# Patient Record
Sex: MALE | Race: WHITE | Employment: FULL TIME | URBAN - METROPOLITAN AREA
[De-identification: names, ages, dates, MRNs, and addresses within clinical notes are randomized per-mention and may not be internally consistent; named-entity substitution may affect disease eponyms.]

---

## 2017-01-26 ENCOUNTER — OFFICE VISIT (OUTPATIENT)
Dept: FAMILY MEDICINE CLINIC | Facility: CLINIC | Age: 52
End: 2017-01-26

## 2017-01-26 VITALS
HEIGHT: 70 IN | SYSTOLIC BLOOD PRESSURE: 120 MMHG | DIASTOLIC BLOOD PRESSURE: 80 MMHG | RESPIRATION RATE: 16 BRPM | BODY MASS INDEX: 33.96 KG/M2 | TEMPERATURE: 98 F | OXYGEN SATURATION: 99 % | WEIGHT: 237.25 LBS | HEART RATE: 78 BPM

## 2017-01-26 DIAGNOSIS — M67.432 GANGLION CYST OF WRIST, LEFT: Primary | ICD-10-CM

## 2017-01-26 DIAGNOSIS — I49.9 CARDIAC ARRHYTHMIA, UNSPECIFIED CARDIAC ARRHYTHMIA TYPE: ICD-10-CM

## 2017-01-26 PROCEDURE — 99212 OFFICE O/P EST SF 10 MIN: CPT | Performed by: FAMILY MEDICINE

## 2017-01-26 RX ORDER — VERAPAMIL HYDROCHLORIDE 240 MG/1
TABLET, FILM COATED, EXTENDED RELEASE ORAL
Qty: 30 TABLET | Refills: 5 | Status: SHIPPED | OUTPATIENT
Start: 2017-01-26 | End: 2017-08-07

## 2017-01-26 NOTE — PROGRESS NOTES
Cristi Vicente is a 46year old male. CHIEF COMPLAINT   Left wrist lump  HPI:   Left wrist lump x 1 month. No pain. No injury.       Current Outpatient Prescriptions:  VERAPAMIL HCL  MG Oral Capsule SR 24 Hr TAKE ONE CAPSULE BY MOUTH EVERY NIGHT Refills    Verapamil HCl  MG Oral Tab CR 30 tablet 5      Sig: TAKE ONE TABLET BY MOUTH EVERY NIGHT AT BEDTIME           Imaging & Consults:  ORTHOPEDIC - INTERNAL    The patient indicates understanding of these issues and agrees to the plan.   The pa

## 2017-05-12 ENCOUNTER — TELEPHONE (OUTPATIENT)
Dept: FAMILY MEDICINE CLINIC | Facility: CLINIC | Age: 52
End: 2017-05-12

## 2017-05-12 ENCOUNTER — CHARTING TRANS (OUTPATIENT)
Dept: OTHER | Age: 52
End: 2017-05-12

## 2017-05-12 NOTE — TELEPHONE ENCOUNTER
Pt transferred to nurse. He states \"I think I may have an eye infection and would like an appt\". Reports having some puffiness under L eye, itchy in the corner. Tender to the touch. No discharge, no vision issues. Started last night.  I advised WIC as we

## 2017-08-07 ENCOUNTER — OFFICE VISIT (OUTPATIENT)
Dept: FAMILY MEDICINE CLINIC | Facility: CLINIC | Age: 52
End: 2017-08-07

## 2017-08-07 VITALS
BODY MASS INDEX: 33.18 KG/M2 | SYSTOLIC BLOOD PRESSURE: 118 MMHG | OXYGEN SATURATION: 98 % | HEART RATE: 76 BPM | WEIGHT: 237 LBS | DIASTOLIC BLOOD PRESSURE: 74 MMHG | TEMPERATURE: 98 F | HEIGHT: 71 IN | RESPIRATION RATE: 20 BRPM

## 2017-08-07 DIAGNOSIS — J40 BRONCHITIS: ICD-10-CM

## 2017-08-07 DIAGNOSIS — J01.00 ACUTE MAXILLARY SINUSITIS, RECURRENCE NOT SPECIFIED: Primary | ICD-10-CM

## 2017-08-07 PROCEDURE — 99213 OFFICE O/P EST LOW 20 MIN: CPT | Performed by: NURSE PRACTITIONER

## 2017-08-07 RX ORDER — ALBUTEROL SULFATE 90 UG/1
AEROSOL, METERED RESPIRATORY (INHALATION)
Qty: 1 INHALER | Refills: 0 | Status: SHIPPED | OUTPATIENT
Start: 2017-08-07 | End: 2018-02-27

## 2017-08-07 RX ORDER — AMOXICILLIN 875 MG/1
875 TABLET, COATED ORAL 2 TIMES DAILY
Qty: 20 TABLET | Refills: 0 | Status: SHIPPED | OUTPATIENT
Start: 2017-08-07 | End: 2017-08-17

## 2017-08-07 RX ORDER — BENZONATATE 200 MG/1
200 CAPSULE ORAL 3 TIMES DAILY PRN
Qty: 20 CAPSULE | Refills: 0 | Status: SHIPPED | OUTPATIENT
Start: 2017-08-07 | End: 2018-02-27 | Stop reason: ALTCHOICE

## 2017-08-07 RX ORDER — VERAPAMIL HYDROCHLORIDE 240 MG/1
CAPSULE, EXTENDED RELEASE ORAL
COMMUNITY
Start: 2017-07-13 | End: 2017-08-09

## 2017-08-07 NOTE — PROGRESS NOTES
CHIEF COMPLAINT:   Patient presents with:  Sinus Problem: s/s for 7-8 days  Cough: wet  Sore Throat      HPI:   Valarie Florez is a 45year old male who presents for cold symptoms for  10  days.  Symptoms have progressed into sinus congestion and been wo N/A      Comment: Procedure: COLONOSCOPY;  Surgeon: Doug Lopez DO;  Location:  ENDOSCOPY  No date: UPPER GI ENDOSCOPY,EXAM   No family history on file.    Smoking status: Never Smoker instructions as listed in Patient Instructions    Meds & Refills for this Visit:    Signed Prescriptions Disp Refills    benzonatate 200 MG Oral Cap 20 capsule 0      Sig: Take 1 capsule (200 mg total) by mouth 3 (three) times daily as needed for cough.

## 2017-08-17 RX ORDER — VERAPAMIL HYDROCHLORIDE 240 MG/1
CAPSULE, EXTENDED RELEASE ORAL
Qty: 30 CAPSULE | Refills: 1 | Status: SHIPPED | OUTPATIENT
Start: 2017-08-17 | End: 2017-10-12

## 2017-10-09 DIAGNOSIS — K21.9 GASTROESOPHAGEAL REFLUX DISEASE WITHOUT ESOPHAGITIS: ICD-10-CM

## 2017-10-09 NOTE — TELEPHONE ENCOUNTER
Pt needs to be seen to follow up on his GERD with either Dr. Rogelio Martinez or S. Dressler. Please call him. Then refill can be forwarded to provider for authorization.

## 2017-10-10 RX ORDER — LANSOPRAZOLE 30 MG/1
CAPSULE, DELAYED RELEASE ORAL
Qty: 90 CAPSULE | Refills: 0 | Status: SHIPPED | OUTPATIENT
Start: 2017-10-10 | End: 2018-02-25

## 2017-10-12 RX ORDER — VERAPAMIL HYDROCHLORIDE 240 MG/1
CAPSULE, EXTENDED RELEASE ORAL
Qty: 30 CAPSULE | Refills: 0 | Status: SHIPPED | OUTPATIENT
Start: 2017-10-12 | End: 2017-11-18

## 2017-11-20 RX ORDER — VERAPAMIL HYDROCHLORIDE 240 MG/1
CAPSULE, EXTENDED RELEASE ORAL
Qty: 30 CAPSULE | Refills: 0 | Status: SHIPPED | OUTPATIENT
Start: 2017-11-20 | End: 2017-11-21

## 2017-11-22 RX ORDER — VERAPAMIL HYDROCHLORIDE 240 MG/1
CAPSULE, EXTENDED RELEASE ORAL
Qty: 30 CAPSULE | Refills: 1 | Status: SHIPPED | OUTPATIENT
Start: 2017-11-22 | End: 2018-02-15

## 2018-02-15 DIAGNOSIS — Z13.220 SCREENING FOR LIPOID DISORDERS: Primary | ICD-10-CM

## 2018-02-15 DIAGNOSIS — Z13.0 SCREENING FOR IRON DEFICIENCY ANEMIA: ICD-10-CM

## 2018-02-15 DIAGNOSIS — Z12.5 SCREENING FOR PROSTATE CANCER: ICD-10-CM

## 2018-02-15 DIAGNOSIS — Z13.228 SCREENING FOR ENDOCRINE, METABOLIC AND IMMUNITY DISORDER: ICD-10-CM

## 2018-02-15 DIAGNOSIS — Z13.0 SCREENING FOR ENDOCRINE, METABOLIC AND IMMUNITY DISORDER: ICD-10-CM

## 2018-02-15 DIAGNOSIS — Z13.29 SCREENING FOR ENDOCRINE, METABOLIC AND IMMUNITY DISORDER: ICD-10-CM

## 2018-02-15 NOTE — TELEPHONE ENCOUNTER
Please call pt. He has not been seen for his cardiac dysrhythmia for over 1 year. He can see either S. Dressler or Dr. Jones Ours.

## 2018-02-16 RX ORDER — VERAPAMIL HYDROCHLORIDE 240 MG/1
CAPSULE, EXTENDED RELEASE ORAL
Qty: 30 CAPSULE | Refills: 0 | Status: SHIPPED | OUTPATIENT
Start: 2018-02-16 | End: 2018-03-25

## 2018-02-16 NOTE — TELEPHONE ENCOUNTER
Patient requested a Physical appointment made for 2/28/18. Patient is also asking for labs to be put in so he doesn't have to come twice.

## 2018-02-25 DIAGNOSIS — K21.9 GASTROESOPHAGEAL REFLUX DISEASE WITHOUT ESOPHAGITIS: ICD-10-CM

## 2018-02-27 ENCOUNTER — OFFICE VISIT (OUTPATIENT)
Dept: FAMILY MEDICINE CLINIC | Facility: CLINIC | Age: 53
End: 2018-02-27

## 2018-02-27 ENCOUNTER — LAB ENCOUNTER (OUTPATIENT)
Dept: LAB | Age: 53
End: 2018-02-27
Attending: FAMILY MEDICINE
Payer: COMMERCIAL

## 2018-02-27 VITALS
RESPIRATION RATE: 16 BRPM | TEMPERATURE: 98 F | HEIGHT: 70.5 IN | HEART RATE: 76 BPM | SYSTOLIC BLOOD PRESSURE: 114 MMHG | WEIGHT: 237 LBS | DIASTOLIC BLOOD PRESSURE: 68 MMHG | BODY MASS INDEX: 33.55 KG/M2

## 2018-02-27 DIAGNOSIS — Z13.228 SCREENING FOR ENDOCRINE, METABOLIC, AND IMMUNITY DISORDER: ICD-10-CM

## 2018-02-27 DIAGNOSIS — Z13.0 SCREENING FOR ENDOCRINE, METABOLIC AND IMMUNITY DISORDER: ICD-10-CM

## 2018-02-27 DIAGNOSIS — Z99.89 OSA ON CPAP: ICD-10-CM

## 2018-02-27 DIAGNOSIS — Z13.29 SCREENING FOR ENDOCRINE, METABOLIC AND IMMUNITY DISORDER: ICD-10-CM

## 2018-02-27 DIAGNOSIS — Z12.5 SCREENING FOR PROSTATE CANCER: ICD-10-CM

## 2018-02-27 DIAGNOSIS — Z13.220 SCREENING FOR LIPOID DISORDERS: ICD-10-CM

## 2018-02-27 DIAGNOSIS — Z13.29 SCREENING FOR ENDOCRINE, METABOLIC, AND IMMUNITY DISORDER: ICD-10-CM

## 2018-02-27 DIAGNOSIS — I49.9 CARDIAC ARRHYTHMIA, UNSPECIFIED CARDIAC ARRHYTHMIA TYPE: ICD-10-CM

## 2018-02-27 DIAGNOSIS — Z13.0 SCREENING FOR ENDOCRINE, METABOLIC, AND IMMUNITY DISORDER: ICD-10-CM

## 2018-02-27 DIAGNOSIS — Z00.00 ANNUAL PHYSICAL EXAM: Primary | ICD-10-CM

## 2018-02-27 DIAGNOSIS — G47.33 OSA ON CPAP: ICD-10-CM

## 2018-02-27 DIAGNOSIS — Z13.0 SCREENING FOR IRON DEFICIENCY ANEMIA: ICD-10-CM

## 2018-02-27 DIAGNOSIS — K21.9 GASTROESOPHAGEAL REFLUX DISEASE WITHOUT ESOPHAGITIS: ICD-10-CM

## 2018-02-27 DIAGNOSIS — Z13.228 SCREENING FOR ENDOCRINE, METABOLIC AND IMMUNITY DISORDER: ICD-10-CM

## 2018-02-27 LAB
ALBUMIN SERPL-MCNC: 3.9 G/DL (ref 3.5–4.8)
ALP LIVER SERPL-CCNC: 60 U/L (ref 45–117)
ALT SERPL-CCNC: 53 U/L (ref 17–63)
AST SERPL-CCNC: 22 U/L (ref 15–41)
BASOPHILS # BLD AUTO: 0.01 X10(3) UL (ref 0–0.1)
BASOPHILS NFR BLD AUTO: 0.1 %
BILIRUB SERPL-MCNC: 0.5 MG/DL (ref 0.1–2)
BUN BLD-MCNC: 12 MG/DL (ref 8–20)
CALCIUM BLD-MCNC: 9.6 MG/DL (ref 8.3–10.3)
CHLORIDE: 103 MMOL/L (ref 101–111)
CHOLEST SMN-MCNC: 199 MG/DL (ref ?–200)
CO2: 28 MMOL/L (ref 22–32)
CREAT BLD-MCNC: 0.84 MG/DL (ref 0.7–1.3)
EOSINOPHIL # BLD AUTO: 0.17 X10(3) UL (ref 0–0.3)
EOSINOPHIL NFR BLD AUTO: 2.3 %
ERYTHROCYTE [DISTWIDTH] IN BLOOD BY AUTOMATED COUNT: 13 % (ref 11.5–16)
GLUCOSE BLD-MCNC: 78 MG/DL (ref 70–99)
HCT VFR BLD AUTO: 46.8 % (ref 37–53)
HDLC SERPL-MCNC: 62 MG/DL (ref 45–?)
HDLC SERPL: 3.21 {RATIO} (ref ?–4.97)
HGB BLD-MCNC: 15.7 G/DL (ref 13–17)
IMMATURE GRANULOCYTE COUNT: 0.03 X10(3) UL (ref 0–1)
IMMATURE GRANULOCYTE RATIO %: 0.4 %
LDLC SERPL CALC-MCNC: 112 MG/DL (ref ?–130)
LYMPHOCYTES # BLD AUTO: 2.3 X10(3) UL (ref 0.9–4)
LYMPHOCYTES NFR BLD AUTO: 31.1 %
M PROTEIN MFR SERPL ELPH: 7.8 G/DL (ref 6.1–8.3)
MCH RBC QN AUTO: 31.2 PG (ref 27–33.2)
MCHC RBC AUTO-ENTMCNC: 33.5 G/DL (ref 31–37)
MCV RBC AUTO: 93 FL (ref 80–99)
MONOCYTES # BLD AUTO: 0.52 X10(3) UL (ref 0.1–1)
MONOCYTES NFR BLD AUTO: 7 %
NEUTROPHIL ABS PRELIM: 4.37 X10 (3) UL (ref 1.3–6.7)
NEUTROPHILS # BLD AUTO: 4.37 X10(3) UL (ref 1.3–6.7)
NEUTROPHILS NFR BLD AUTO: 59.1 %
NONHDLC SERPL-MCNC: 137 MG/DL (ref ?–130)
PLATELET # BLD AUTO: 321 10(3)UL (ref 150–450)
POTASSIUM SERPL-SCNC: 3.9 MMOL/L (ref 3.6–5.1)
PSA SERPL-MCNC: 2.16 NG/ML (ref 0.01–4)
RBC # BLD AUTO: 5.03 X10(6)UL (ref 4.3–5.7)
RED CELL DISTRIBUTION WIDTH-SD: 44.6 FL (ref 35.1–46.3)
SODIUM SERPL-SCNC: 140 MMOL/L (ref 136–144)
TRIGL SERPL-MCNC: 127 MG/DL (ref ?–150)
VLDLC SERPL CALC-MCNC: 25 MG/DL (ref 5–40)
WBC # BLD AUTO: 7.4 X10(3) UL (ref 4–13)

## 2018-02-27 PROCEDURE — 84153 ASSAY OF PSA TOTAL: CPT | Performed by: FAMILY MEDICINE

## 2018-02-27 PROCEDURE — 80061 LIPID PANEL: CPT | Performed by: FAMILY MEDICINE

## 2018-02-27 PROCEDURE — 80053 COMPREHEN METABOLIC PANEL: CPT | Performed by: FAMILY MEDICINE

## 2018-02-27 PROCEDURE — 99396 PREV VISIT EST AGE 40-64: CPT | Performed by: FAMILY MEDICINE

## 2018-02-27 PROCEDURE — 36415 COLL VENOUS BLD VENIPUNCTURE: CPT | Performed by: FAMILY MEDICINE

## 2018-02-27 PROCEDURE — 85025 COMPLETE CBC W/AUTO DIFF WBC: CPT | Performed by: FAMILY MEDICINE

## 2018-02-27 NOTE — PROGRESS NOTES
Shan Harry is a 45year old male who presents for a complete physical exam.   HPI:   Pt complains of nothing specific. He states his arrhythmia has been quiescent.       Wt Readings from Last 6 Encounters:  02/27/18 : 237 lb  08/07/17 : 237 lb  01/26 and gastroduodenitis without mention of hemorrhage    • Unspecified visual disturbance    • Visual impairment     glasses,contacts       PAST SURGICAL HISTORY:     Past Surgical History:  2001: CHOLECYSTECTOMY  No date: COLONOSCOPY  7/2/2015: COLONOSCOPY N BS's,no masses, HSM or tenderness  : two descended testes,no masses,no hernia,no penile lesions  RECTAL: good rectal tone, prostate shows no masses, stool is OB negative  MUSCULOSKELETAL: back is not tender, no joint swelling noted  EXTREMITIES: no cyano

## 2018-02-28 ENCOUNTER — TELEPHONE (OUTPATIENT)
Dept: FAMILY MEDICINE CLINIC | Facility: CLINIC | Age: 53
End: 2018-02-28

## 2018-02-28 DIAGNOSIS — R10.9 RIGHT SIDED ABDOMINAL PAIN: Primary | ICD-10-CM

## 2018-02-28 NOTE — TELEPHONE ENCOUNTER
----- Message from Isela Whitt, [address] sent at 2/28/2018 12:52 PM CST -----  Called pt with findings and recommendations. Pt voiced understanding and agreed to plan.   Pt stated that at his last visit Dr Richy Abrams had said that he would order an US for pt'

## 2018-03-01 RX ORDER — LANSOPRAZOLE 30 MG/1
CAPSULE, DELAYED RELEASE ORAL
Qty: 90 CAPSULE | Refills: 1 | Status: SHIPPED | OUTPATIENT
Start: 2018-03-01 | End: 2021-09-23

## 2018-03-26 RX ORDER — VERAPAMIL HYDROCHLORIDE 240 MG/1
CAPSULE, EXTENDED RELEASE ORAL
Qty: 90 CAPSULE | Refills: 0 | Status: SHIPPED | OUTPATIENT
Start: 2018-03-26 | End: 2018-06-30

## 2018-06-14 ENCOUNTER — CHARTING TRANS (OUTPATIENT)
Dept: OTHER | Age: 53
End: 2018-06-14

## 2018-07-02 RX ORDER — VERAPAMIL HYDROCHLORIDE 240 MG/1
CAPSULE, EXTENDED RELEASE ORAL
Qty: 30 CAPSULE | Refills: 1 | Status: SHIPPED | OUTPATIENT
Start: 2018-07-02 | End: 2018-08-29

## 2018-09-04 RX ORDER — VERAPAMIL HYDROCHLORIDE 240 MG/1
CAPSULE, EXTENDED RELEASE ORAL
Qty: 30 CAPSULE | Refills: 0 | Status: SHIPPED | OUTPATIENT
Start: 2018-09-04 | End: 2018-09-28

## 2018-10-01 RX ORDER — VERAPAMIL HYDROCHLORIDE 240 MG/1
CAPSULE, EXTENDED RELEASE ORAL
Qty: 30 CAPSULE | Refills: 0 | Status: SHIPPED | OUTPATIENT
Start: 2018-10-01 | End: 2018-11-05

## 2018-11-01 VITALS — TEMPERATURE: 98.3 F

## 2018-11-06 RX ORDER — VERAPAMIL HYDROCHLORIDE 240 MG/1
CAPSULE, EXTENDED RELEASE ORAL
Qty: 90 CAPSULE | Refills: 0 | Status: SHIPPED | OUTPATIENT
Start: 2018-11-06 | End: 2019-02-26

## 2018-11-06 RX ORDER — VERAPAMIL HYDROCHLORIDE 240 MG/1
CAPSULE, EXTENDED RELEASE ORAL
Qty: 30 CAPSULE | Refills: 0 | Status: SHIPPED | OUTPATIENT
Start: 2018-11-06 | End: 2018-11-06

## 2018-12-04 ENCOUNTER — OFFICE VISIT (OUTPATIENT)
Dept: FAMILY MEDICINE CLINIC | Facility: CLINIC | Age: 53
End: 2018-12-04
Payer: COMMERCIAL

## 2018-12-04 VITALS
BODY MASS INDEX: 34.26 KG/M2 | HEART RATE: 76 BPM | WEIGHT: 242 LBS | SYSTOLIC BLOOD PRESSURE: 122 MMHG | DIASTOLIC BLOOD PRESSURE: 85 MMHG | HEIGHT: 70.5 IN | RESPIRATION RATE: 16 BRPM

## 2018-12-04 DIAGNOSIS — I49.9 CARDIAC ARRHYTHMIA, UNSPECIFIED CARDIAC ARRHYTHMIA TYPE: Primary | ICD-10-CM

## 2018-12-04 DIAGNOSIS — Z12.5 SCREENING FOR PROSTATE CANCER: ICD-10-CM

## 2018-12-04 DIAGNOSIS — K21.9 GASTROESOPHAGEAL REFLUX DISEASE WITHOUT ESOPHAGITIS: ICD-10-CM

## 2018-12-04 DIAGNOSIS — Z13.220 SCREENING FOR LIPOID DISORDERS: ICD-10-CM

## 2018-12-04 DIAGNOSIS — Z80.8 FAMILY HISTORY OF MELANOMA: ICD-10-CM

## 2018-12-04 PROCEDURE — 99214 OFFICE O/P EST MOD 30 MIN: CPT | Performed by: FAMILY MEDICINE

## 2018-12-04 NOTE — PROGRESS NOTES
Pastor Bashir is a 52year old male. HPI:   Pt is here for follow up of his cardiac dysrhythmia. Has had no chest pain or SOB. No palpitations.   He does have a history of GERD and takes lansoprazole 30 mg 1 capsule daily as needed  Has family hx of me well developed, well nourished,in no apparent distress  SKIN: no rashes,no suspicious lesions. Wart on right index finger.   HEENT: atraumatic, normocephalic,ears and throat are clear  NECK: supple,no adenopathy,no bruits  LUNGS: clear to auscultation  CARD

## 2019-02-26 RX ORDER — VERAPAMIL HYDROCHLORIDE 240 MG/1
CAPSULE, EXTENDED RELEASE ORAL
Qty: 30 CAPSULE | Refills: 3 | Status: SHIPPED | OUTPATIENT
Start: 2019-02-26 | End: 2019-07-10

## 2019-05-02 ENCOUNTER — LAB ENCOUNTER (OUTPATIENT)
Dept: LAB | Age: 54
End: 2019-05-02
Attending: FAMILY MEDICINE
Payer: COMMERCIAL

## 2019-05-02 DIAGNOSIS — K21.9 GASTROESOPHAGEAL REFLUX DISEASE WITHOUT ESOPHAGITIS: ICD-10-CM

## 2019-05-02 DIAGNOSIS — I49.9 CARDIAC ARRHYTHMIA, UNSPECIFIED CARDIAC ARRHYTHMIA TYPE: ICD-10-CM

## 2019-05-02 DIAGNOSIS — Z13.220 SCREENING FOR LIPOID DISORDERS: ICD-10-CM

## 2019-05-02 DIAGNOSIS — Z12.5 SCREENING FOR PROSTATE CANCER: ICD-10-CM

## 2019-05-02 PROCEDURE — 80053 COMPREHEN METABOLIC PANEL: CPT | Performed by: FAMILY MEDICINE

## 2019-05-02 PROCEDURE — 80061 LIPID PANEL: CPT | Performed by: FAMILY MEDICINE

## 2019-05-02 PROCEDURE — 36415 COLL VENOUS BLD VENIPUNCTURE: CPT | Performed by: FAMILY MEDICINE

## 2019-05-02 PROCEDURE — 84153 ASSAY OF PSA TOTAL: CPT | Performed by: FAMILY MEDICINE

## 2019-05-02 PROCEDURE — 85025 COMPLETE CBC W/AUTO DIFF WBC: CPT | Performed by: FAMILY MEDICINE

## 2019-05-03 NOTE — PROGRESS NOTES
[address] OZZIE Herrera test(s) are unremarkable. Please call if you have any questions.     Jose Solis MD

## 2019-07-11 NOTE — TELEPHONE ENCOUNTER
Please call pt to schedule cardiac arrhthmia and GERD med check with Dr. Cici Perez. LOV: 12/4/18 asked to return in 6/2019  Thank you!

## 2019-07-12 RX ORDER — VERAPAMIL HYDROCHLORIDE 240 MG/1
CAPSULE, EXTENDED RELEASE ORAL
Qty: 30 CAPSULE | Refills: 1 | Status: SHIPPED | OUTPATIENT
Start: 2019-07-12 | End: 2019-09-10

## 2019-07-31 PROBLEM — D12.3 BENIGN NEOPLASM OF TRANSVERSE COLON: Status: ACTIVE | Noted: 2019-07-31

## 2019-07-31 PROBLEM — D12.9 BENIGN NEOPLASM OF RECTUM AND ANAL CANAL: Status: ACTIVE | Noted: 2019-07-31

## 2019-07-31 PROBLEM — R10.11 RIGHT UPPER QUADRANT PAIN: Status: ACTIVE | Noted: 2019-07-31

## 2019-07-31 PROBLEM — Z86.010 PERSONAL HISTORY OF COLONIC POLYPS: Status: ACTIVE | Noted: 2019-07-31

## 2019-07-31 PROBLEM — R14.1 GAS PAIN: Status: ACTIVE | Noted: 2019-07-31

## 2019-07-31 PROBLEM — Z86.0100 PERSONAL HISTORY OF COLONIC POLYPS: Status: ACTIVE | Noted: 2019-07-31

## 2019-07-31 PROBLEM — D12.8 BENIGN NEOPLASM OF RECTUM AND ANAL CANAL: Status: ACTIVE | Noted: 2019-07-31

## 2019-09-10 RX ORDER — VERAPAMIL HYDROCHLORIDE 240 MG/1
CAPSULE, EXTENDED RELEASE ORAL
Qty: 30 CAPSULE | Refills: 0 | Status: SHIPPED | OUTPATIENT
Start: 2019-09-10 | End: 2019-10-28

## 2019-10-28 ENCOUNTER — HOSPITAL ENCOUNTER (OUTPATIENT)
Dept: CT IMAGING | Facility: HOSPITAL | Age: 54
Discharge: HOME | End: 2019-10-28
Attending: INTERNAL MEDICINE
Payer: COMMERCIAL

## 2019-10-28 DIAGNOSIS — R10.11 RUQ PAIN: ICD-10-CM

## 2019-10-28 PROCEDURE — 74177 CT ABD & PELVIS W/CONTRAST: CPT | Performed by: INTERNAL MEDICINE

## 2019-10-28 PROCEDURE — 82565 ASSAY OF CREATININE: CPT

## 2019-10-29 RX ORDER — VERAPAMIL HYDROCHLORIDE 240 MG/1
CAPSULE, EXTENDED RELEASE ORAL
Qty: 30 CAPSULE | Refills: 0 | Status: SHIPPED | OUTPATIENT
Start: 2019-10-29 | End: 2019-11-26

## 2019-11-01 ENCOUNTER — OFFICE VISIT (OUTPATIENT)
Dept: FAMILY MEDICINE CLINIC | Facility: CLINIC | Age: 54
End: 2019-11-01
Payer: COMMERCIAL

## 2019-11-01 VITALS
SYSTOLIC BLOOD PRESSURE: 130 MMHG | HEART RATE: 68 BPM | RESPIRATION RATE: 16 BRPM | HEIGHT: 71 IN | WEIGHT: 243 LBS | DIASTOLIC BLOOD PRESSURE: 82 MMHG | TEMPERATURE: 97 F | BODY MASS INDEX: 34.02 KG/M2

## 2019-11-01 DIAGNOSIS — I49.9 CARDIAC ARRHYTHMIA, UNSPECIFIED CARDIAC ARRHYTHMIA TYPE: ICD-10-CM

## 2019-11-01 DIAGNOSIS — H43.393 FLOATERS IN VISUAL FIELD, BILATERAL: ICD-10-CM

## 2019-11-01 DIAGNOSIS — Z99.89 OSA ON CPAP: ICD-10-CM

## 2019-11-01 DIAGNOSIS — L30.9 DERMATITIS: ICD-10-CM

## 2019-11-01 DIAGNOSIS — K40.90 RIGHT INGUINAL HERNIA: ICD-10-CM

## 2019-11-01 DIAGNOSIS — K42.9 UMBILICAL HERNIA WITHOUT OBSTRUCTION AND WITHOUT GANGRENE: ICD-10-CM

## 2019-11-01 DIAGNOSIS — K21.9 GASTROESOPHAGEAL REFLUX DISEASE WITHOUT ESOPHAGITIS: ICD-10-CM

## 2019-11-01 DIAGNOSIS — G47.33 OSA ON CPAP: ICD-10-CM

## 2019-11-01 DIAGNOSIS — Z23 NEED FOR VACCINATION: ICD-10-CM

## 2019-11-01 DIAGNOSIS — Z00.00 ANNUAL PHYSICAL EXAM: Primary | ICD-10-CM

## 2019-11-01 PROCEDURE — 90471 IMMUNIZATION ADMIN: CPT | Performed by: FAMILY MEDICINE

## 2019-11-01 PROCEDURE — 90686 IIV4 VACC NO PRSV 0.5 ML IM: CPT | Performed by: FAMILY MEDICINE

## 2019-11-01 PROCEDURE — 99396 PREV VISIT EST AGE 40-64: CPT | Performed by: FAMILY MEDICINE

## 2019-11-01 NOTE — PROGRESS NOTES
Valarie Florez is a 62year old male who presents for a complete physical exam.   HPI:   Pt complains of nothing specific. He states his arrhythmia has been quiescent. He has a history of GERD for which he takes lansoprazole.   He uses a CPAP for his OS in the stool    • Calculus of kidney    • Cardiac dysrhythmia, unspecified    • Cellulitis and abscess of toe, unspecified    • Constipation    • Diarrhea    • Diarrhea, unspecified    • Esophageal reflux    • Flatulence/gas pain/belching    • Heart palpit Resp 16   Ht 71\"   Wt 243 lb (110.2 kg)   BMI 33.89 kg/m²   Body mass index is 33.89 kg/m².    GENERAL: well developed, well nourished,in no apparent distress  SKIN: no rashes,no suspicious lesions  HEENT: atraumatic, normocephalic,ears and throat are cl

## 2019-11-07 ENCOUNTER — PATIENT MESSAGE (OUTPATIENT)
Dept: FAMILY MEDICINE CLINIC | Facility: CLINIC | Age: 54
End: 2019-11-07

## 2019-11-07 DIAGNOSIS — G47.33 OSA ON CPAP: ICD-10-CM

## 2019-11-07 DIAGNOSIS — Z99.89 OSA ON CPAP: ICD-10-CM

## 2019-11-07 DIAGNOSIS — S49.91XA ARM INJURIES, RIGHT, INITIAL ENCOUNTER: Primary | ICD-10-CM

## 2019-11-08 NOTE — TELEPHONE ENCOUNTER
CPAP setting and maintenance should be done by pulmonologist.  If he doesn't have one, would recommend Dr. Mykel Boucher. Would wait for Dr. Bryson Yepez regarding referral for arm injury.

## 2019-11-11 NOTE — TELEPHONE ENCOUNTER
Yes, I thought I have given the patient Dr. Huber Wood name. I put a referral in for this. It would be reasonable for the patient to have a CPAP titration. Order placed.

## 2019-11-26 RX ORDER — VERAPAMIL HYDROCHLORIDE 240 MG/1
CAPSULE, EXTENDED RELEASE ORAL
Qty: 30 CAPSULE | Refills: 5 | Status: SHIPPED | OUTPATIENT
Start: 2019-11-26 | End: 2020-06-01

## 2020-04-24 ENCOUNTER — TELEPHONE (OUTPATIENT)
Dept: FAMILY MEDICINE CLINIC | Facility: CLINIC | Age: 55
End: 2020-04-24

## 2020-04-24 DIAGNOSIS — J06.9 VIRAL URI: Primary | ICD-10-CM

## 2020-04-24 NOTE — TELEPHONE ENCOUNTER
Order placed.   Patient should be contacted by [facility]

## 2020-04-24 NOTE — TELEPHONE ENCOUNTER
See note below, pt states he has a tickle in back of throat, frontal headache, denies fever or SOB,  Has been at work with people calling out sick, he is a manager at [employer], he is home today, pt's wife is positive and at home.

## 2020-04-24 NOTE — TELEPHONE ENCOUNTER
What symptoms is the patient experiencing?:  Dry cough headache, body ache, stuffy nose     Has the patient traveled to an effected geographic area ([place], [place], [facility], [facility], [place], [facility])?  - IF YES, SEND TO [facility]:   no    Has the patient had ANY

## 2020-04-26 ENCOUNTER — PATIENT MESSAGE (OUTPATIENT)
Dept: FAMILY MEDICINE CLINIC | Facility: CLINIC | Age: 55
End: 2020-04-26

## 2020-04-27 ENCOUNTER — LAB ENCOUNTER (OUTPATIENT)
Dept: LAB | Facility: HOSPITAL | Age: 55
End: 2020-04-27
Attending: FAMILY MEDICINE
Payer: COMMERCIAL

## 2020-04-27 DIAGNOSIS — J06.9 VIRAL URI: ICD-10-CM

## 2020-06-01 RX ORDER — VERAPAMIL HYDROCHLORIDE 240 MG/1
CAPSULE, EXTENDED RELEASE ORAL
Qty: 30 CAPSULE | Refills: 0 | Status: SHIPPED | OUTPATIENT
Start: 2020-06-01 | End: 2020-07-09

## 2020-06-25 ENCOUNTER — MED REC SCAN ONLY (OUTPATIENT)
Dept: FAMILY MEDICINE CLINIC | Facility: CLINIC | Age: 55
End: 2020-06-25

## 2020-07-09 RX ORDER — VERAPAMIL HYDROCHLORIDE 240 MG/1
CAPSULE, EXTENDED RELEASE ORAL
Qty: 30 CAPSULE | Refills: 0 | Status: SHIPPED | OUTPATIENT
Start: 2020-07-09 | End: 2020-08-06

## 2020-07-09 RX ORDER — VERAPAMIL HYDROCHLORIDE 240 MG/1
CAPSULE, EXTENDED RELEASE ORAL
Qty: 30 CAPSULE | Refills: 0 | OUTPATIENT
Start: 2020-07-09

## 2020-07-20 ENCOUNTER — TELEPHONE (OUTPATIENT)
Dept: FAMILY MEDICINE CLINIC | Facility: CLINIC | Age: 55
End: 2020-07-20

## 2020-07-20 DIAGNOSIS — Z01.818 PRE-OP TESTING: Primary | ICD-10-CM

## 2020-07-20 NOTE — TELEPHONE ENCOUNTER
Pt is scheduled for vitrectomy of right eye on 8/27/2020 at [facility] with Dr. Heather Novak. Requesting CBC and EKG. Ordered.     Pt has pre op exam 8/6/2020

## 2020-07-21 ENCOUNTER — MED REC SCAN ONLY (OUTPATIENT)
Dept: FAMILY MEDICINE CLINIC | Facility: CLINIC | Age: 55
End: 2020-07-21

## 2020-07-27 ENCOUNTER — TELEPHONE (OUTPATIENT)
Dept: FAMILY MEDICINE CLINIC | Facility: CLINIC | Age: 55
End: 2020-07-27

## 2020-07-27 ENCOUNTER — HOSPITAL ENCOUNTER (OUTPATIENT)
Dept: GENERAL RADIOLOGY | Age: 55
Discharge: HOME | End: 2020-07-27
Attending: NURSE PRACTITIONER
Payer: COMMERCIAL

## 2020-07-27 ENCOUNTER — OFFICE VISIT (OUTPATIENT)
Dept: FAMILY MEDICINE CLINIC | Facility: CLINIC | Age: 55
End: 2020-07-27
Payer: COMMERCIAL

## 2020-07-27 VITALS
HEIGHT: 71 IN | DIASTOLIC BLOOD PRESSURE: 78 MMHG | WEIGHT: 241 LBS | HEART RATE: 68 BPM | BODY MASS INDEX: 33.74 KG/M2 | RESPIRATION RATE: 16 BRPM | SYSTOLIC BLOOD PRESSURE: 122 MMHG | TEMPERATURE: 98 F

## 2020-07-27 DIAGNOSIS — M25.562 ACUTE PAIN OF LEFT KNEE: ICD-10-CM

## 2020-07-27 DIAGNOSIS — M25.552 LEFT HIP PAIN: Primary | ICD-10-CM

## 2020-07-27 DIAGNOSIS — M25.552 LEFT HIP PAIN: ICD-10-CM

## 2020-07-27 DIAGNOSIS — M62.89 MUSCLE TIGHTNESS: ICD-10-CM

## 2020-07-27 PROCEDURE — 73502 X-RAY EXAM HIP UNI 2-3 VIEWS: CPT | Performed by: NURSE PRACTITIONER

## 2020-07-27 PROCEDURE — 3008F BODY MASS INDEX DOCD: CPT | Performed by: NURSE PRACTITIONER

## 2020-07-27 PROCEDURE — 73560 X-RAY EXAM OF KNEE 1 OR 2: CPT | Performed by: NURSE PRACTITIONER

## 2020-07-27 PROCEDURE — 99214 OFFICE O/P EST MOD 30 MIN: CPT | Performed by: NURSE PRACTITIONER

## 2020-07-27 PROCEDURE — 3078F DIAST BP <80 MM HG: CPT | Performed by: NURSE PRACTITIONER

## 2020-07-27 PROCEDURE — 3074F SYST BP LT 130 MM HG: CPT | Performed by: NURSE PRACTITIONER

## 2020-07-27 RX ORDER — CYCLOBENZAPRINE HCL 10 MG
10 TABLET ORAL 3 TIMES DAILY PRN
Qty: 15 TABLET | Refills: 0 | Status: SHIPPED | OUTPATIENT
Start: 2020-07-27 | End: 2020-09-03 | Stop reason: ALTCHOICE

## 2020-07-27 RX ORDER — DICLOFENAC SODIUM 75 MG/1
75 TABLET, DELAYED RELEASE ORAL 2 TIMES DAILY
Qty: 28 TABLET | Refills: 0 | Status: SHIPPED | OUTPATIENT
Start: 2020-07-27 | End: 2020-08-10

## 2020-07-27 NOTE — PROGRESS NOTES
Cristi Vicente is a 77year old male. HPI:   Patient presents today reporting a 1 week history of left hip pain and left knee pain. No injury or trauma.  He rates the pain as a \"6-7/10\" when he is walking, when he pivots the pain goes to a \"10/10\" a • Ingrowing nail    • Irregular bowel habits    • Kidney stones     10/2016 seen at [address]    • Pain in joint, shoulder region    • Unspecified gastritis and gastroduodenitis without mention of hemorrhage    • Unspecified visual disturbance    • V sensorimotor deficit  Psychological: Mood and affect are normal.  Good communication skills.   ASSESSMENT AND PLAN:     Left hip pain  (primary encounter diagnosis)  Acute pain of left knee  Muscle tightness    No orders of the defined types were placed in KNEE (1 OR 2 VIEWS), LEFT (CPT=73560); Future  - OP REFERRAL TO [facility]  - Diclofenac Sodium 75 MG Oral Tab EC; Take 1 tablet (75 mg total) by mouth 2 (two) times daily for 14 days. Dispense: 28 tablet; Refill: 0    3.  Muscle tight

## 2020-07-27 NOTE — TELEPHONE ENCOUNTER
Katrina from [employer] calling to clarify pt's  and address for recent rx from [place]. She said she s/w pt, his  is actually [phone]    I have confirmed via his DL that this is correct. She has [place] address on file the same as ours.     I will have

## 2020-08-06 ENCOUNTER — LAB ENCOUNTER (OUTPATIENT)
Dept: LAB | Age: 55
End: 2020-08-06
Attending: FAMILY MEDICINE
Payer: COMMERCIAL

## 2020-08-06 ENCOUNTER — OFFICE VISIT (OUTPATIENT)
Dept: FAMILY MEDICINE CLINIC | Facility: CLINIC | Age: 55
End: 2020-08-06
Payer: COMMERCIAL

## 2020-08-06 VITALS
SYSTOLIC BLOOD PRESSURE: 102 MMHG | BODY MASS INDEX: 33.74 KG/M2 | TEMPERATURE: 98 F | WEIGHT: 241 LBS | DIASTOLIC BLOOD PRESSURE: 72 MMHG | RESPIRATION RATE: 16 BRPM | HEART RATE: 84 BPM | HEIGHT: 71 IN

## 2020-08-06 DIAGNOSIS — Z01.818 PRE-OP TESTING: ICD-10-CM

## 2020-08-06 DIAGNOSIS — Z01.818 PREOPERATIVE CLEARANCE: Primary | ICD-10-CM

## 2020-08-06 DIAGNOSIS — K21.9 GASTROESOPHAGEAL REFLUX DISEASE WITHOUT ESOPHAGITIS: ICD-10-CM

## 2020-08-06 DIAGNOSIS — I49.9 CARDIAC ARRHYTHMIA, UNSPECIFIED CARDIAC ARRHYTHMIA TYPE: ICD-10-CM

## 2020-08-06 DIAGNOSIS — H35.371 MACULAR PUCKER, RIGHT: ICD-10-CM

## 2020-08-06 DIAGNOSIS — M54.32 LEFT SIDED SCIATICA: ICD-10-CM

## 2020-08-06 LAB
BASOPHILS # BLD AUTO: 0.02 X10(3) UL (ref 0–0.2)
BASOPHILS NFR BLD AUTO: 0.3 %
DEPRECATED RDW RBC AUTO: 43.3 FL (ref 35.1–46.3)
EOSINOPHIL # BLD AUTO: 0.18 X10(3) UL (ref 0–0.7)
EOSINOPHIL NFR BLD AUTO: 2.9 %
ERYTHROCYTE [DISTWIDTH] IN BLOOD BY AUTOMATED COUNT: 12.6 % (ref 11–15)
HCT VFR BLD AUTO: 46.1 % (ref 39–53)
HGB BLD-MCNC: 15.2 G/DL (ref 13–17.5)
IMM GRANULOCYTES # BLD AUTO: 0.02 X10(3) UL (ref 0–1)
IMM GRANULOCYTES NFR BLD: 0.3 %
LYMPHOCYTES # BLD AUTO: 2.4 X10(3) UL (ref 1–4)
LYMPHOCYTES NFR BLD AUTO: 38.8 %
MCH RBC QN AUTO: 30.7 PG (ref 26–34)
MCHC RBC AUTO-ENTMCNC: 33 G/DL (ref 31–37)
MCV RBC AUTO: 93.1 FL (ref 80–100)
MONOCYTES # BLD AUTO: 0.45 X10(3) UL (ref 0.1–1)
MONOCYTES NFR BLD AUTO: 7.3 %
NEUTROPHILS # BLD AUTO: 3.11 X10 (3) UL (ref 1.5–7.7)
NEUTROPHILS # BLD AUTO: 3.11 X10(3) UL (ref 1.5–7.7)
NEUTROPHILS NFR BLD AUTO: 50.4 %
PLATELET # BLD AUTO: 303 10(3)UL (ref 150–450)
RBC # BLD AUTO: 4.95 X10(6)UL (ref 4.3–5.7)
WBC # BLD AUTO: 6.2 X10(3) UL (ref 4–11)

## 2020-08-06 PROCEDURE — 3074F SYST BP LT 130 MM HG: CPT | Performed by: FAMILY MEDICINE

## 2020-08-06 PROCEDURE — 99243 OFF/OP CNSLTJ NEW/EST LOW 30: CPT | Performed by: FAMILY MEDICINE

## 2020-08-06 PROCEDURE — 3008F BODY MASS INDEX DOCD: CPT | Performed by: FAMILY MEDICINE

## 2020-08-06 PROCEDURE — 36415 COLL VENOUS BLD VENIPUNCTURE: CPT | Performed by: FAMILY MEDICINE

## 2020-08-06 PROCEDURE — 3078F DIAST BP <80 MM HG: CPT | Performed by: FAMILY MEDICINE

## 2020-08-06 PROCEDURE — 85025 COMPLETE CBC W/AUTO DIFF WBC: CPT | Performed by: FAMILY MEDICINE

## 2020-08-06 RX ORDER — VERAPAMIL HYDROCHLORIDE 240 MG/1
CAPSULE, EXTENDED RELEASE ORAL
Qty: 90 CAPSULE | Refills: 1 | Status: SHIPPED | OUTPATIENT
Start: 2020-08-06 | End: 2021-02-08

## 2020-08-06 RX ORDER — METHYLPREDNISOLONE 4 MG/1
TABLET ORAL
Qty: 1 KIT | Refills: 0 | Status: SHIPPED | OUTPATIENT
Start: 2020-08-06 | End: 2020-09-03 | Stop reason: ALTCHOICE

## 2020-08-06 NOTE — PROGRESS NOTES
Isabela Barber is a 60year old male who presents for a pre-operative physical exam. Patient is to have vitrectomy of right eye, to be done by Dr. Bibiana Hummel at [facility] on 8/27/2020.       HPI:   Pt complains of having right peripheral vision fla History:   Procedure Laterality Date   • CHOLECYSTECTOMY  2001   • COLONOSCOPY     • COLONOSCOPY N/A 7/2/2015    Performed by Venecia Lopez DO at [facility] ENDOSCOPY   • ESOPHAGOGASTRODUODENOSCOPY (EGD) N/A 8/20/2015    Performed by Venecia Lopez DO at [facility] right eye, to be done by Dr. Bibiana Hummel at [facility] on 8/27/2020. Pt has the following conditions: GERD, CORAZON, cardiac dysrhythmia. Pt has no significant history of pulmonary conditions. Pt is a good surgical candidate.  This consult was sent back the

## 2020-08-07 ENCOUNTER — MED REC SCAN ONLY (OUTPATIENT)
Dept: FAMILY MEDICINE CLINIC | Facility: CLINIC | Age: 55
End: 2020-08-07

## 2020-08-14 ENCOUNTER — PATIENT MESSAGE (OUTPATIENT)
Dept: FAMILY MEDICINE CLINIC | Facility: CLINIC | Age: 55
End: 2020-08-14

## 2020-08-14 NOTE — TELEPHONE ENCOUNTER
Pt was given a medrol dose lela for his left  Buttock,hip and upper leg pain. He completed medication and states pain is getting worse at times. He wants to know what is next step?  See my chart message

## 2020-08-14 NOTE — TELEPHONE ENCOUNTER
From: Isabela Barber  To: Santa Pascal MD  Sent: 8/14/2020 10:15 AM CDT  Subject: Visit Follow-up Question    Completed medication for leg pain   Pain continues and is more at times   Next step?

## 2020-08-18 ENCOUNTER — TELEPHONE (OUTPATIENT)
Dept: FAMILY MEDICINE CLINIC | Facility: CLINIC | Age: 55
End: 2020-08-18

## 2020-08-18 NOTE — TELEPHONE ENCOUNTER
2021 call from mando//dr phillips's ofc-sts pt is scheduled for vitrectomy 5/27/2020.    Requesting pt's preop clearance notes and lab results faxed to her at fax # [phone]  Record shows preop visit w dr Pinky Hough done 8/6 noting cleared

## 2020-08-24 ENCOUNTER — LAB REQUISITION (OUTPATIENT)
Dept: LAB | Facility: HOSPITAL | Age: 55
End: 2020-08-24
Payer: COMMERCIAL

## 2020-08-24 DIAGNOSIS — Z20.828 CONTACT WITH AND (SUSPECTED) EXPOSURE TO OTHER VIRAL COMMUNICABLE DISEASES: ICD-10-CM

## 2020-08-25 LAB — SARS-COV-2 RNA RESP QL NAA+PROBE: NOT DETECTED

## 2020-08-31 NOTE — TELEPHONE ENCOUNTER
Pt states he is not able to get in until 9/15. He is asking if we can help get him in sooner. Please advise. Thank you.

## 2020-09-25 ENCOUNTER — OFFICE VISIT (OUTPATIENT)
Dept: ORTHOPEDICS CLINIC | Facility: CLINIC | Age: 55
End: 2020-09-25
Payer: COMMERCIAL

## 2020-09-25 VITALS — HEIGHT: 71 IN | BODY MASS INDEX: 33.6 KG/M2 | WEIGHT: 240 LBS

## 2020-09-25 DIAGNOSIS — M16.12 PRIMARY OSTEOARTHRITIS OF LEFT HIP: Primary | ICD-10-CM

## 2020-09-25 PROCEDURE — 99244 OFF/OP CNSLTJ NEW/EST MOD 40: CPT | Performed by: ORTHOPAEDIC SURGERY

## 2020-09-25 PROCEDURE — 3008F BODY MASS INDEX DOCD: CPT | Performed by: ORTHOPAEDIC SURGERY

## 2020-09-25 RX ORDER — PREDNISOLONE ACETATE 10 MG/ML
SUSPENSION/ DROPS OPHTHALMIC
COMMUNITY
Start: 2020-08-28 | End: 2020-11-05 | Stop reason: ALTCHOICE

## 2020-09-25 RX ORDER — GENTAMICIN SULFATE 3 MG/ML
SOLUTION/ DROPS OPHTHALMIC
COMMUNITY
Start: 2020-08-28 | End: 2020-11-05 | Stop reason: ALTCHOICE

## 2020-09-25 RX ORDER — MELOXICAM 7.5 MG/1
7.5 TABLET ORAL DAILY
Qty: 30 TABLET | Refills: 1 | Status: SHIPPED | OUTPATIENT
Start: 2020-09-25 | End: 2021-07-13 | Stop reason: ALTCHOICE

## 2020-09-25 RX ORDER — ATROPINE SULFATE 10 MG/ML
1 SOLUTION/ DROPS OPHTHALMIC 2 TIMES DAILY
COMMUNITY
Start: 2020-08-28 | End: 2020-11-05 | Stop reason: ALTCHOICE

## 2020-09-25 NOTE — PROGRESS NOTES
NURSING INTAKE COMMENTS: Patient presents with:  Hip Pain: Left - here for a 2nd opinioin - onset in July - no injury - has x-rays in the system and was told he has arthritis - was seen by another orthopedic and he wants a 2nd opinion on his issue - rates CHOLECYSTECTOMY  2001   • COLONOSCOPY     • COLONOSCOPY N/A 7/2/2015    Performed by Sole Castro DO at [facility] ENDOSCOPY   • ESOPHAGOGASTRODUODENOSCOPY (EGD) N/A 8/20/2015    Performed by Sole Castro DO at [facility] ENDOSCOPY   • UPPER GI ENDOSCOPY,EXAM urinating  MUSCULOSKELETAL: denies musculoskeletal complaints other than in HPI  NEURO: denies numbness, tingling, weakness, balance issues, dizziness, memory loss  PSYCHIATRIC: denies Hx of depression, anxiety, other psychiatric disorders  HEMATOLOGIC: de osteonecrosis.   Labs:  Lab Results   Component Value Date    WBC 6.2 08/06/2020    HGB 15.2 08/06/2020    .0 08/06/2020      Lab Results   Component Value Date    GLU 77 05/02/2019    BUN 14 05/02/2019    CREATSERUM 0.91 05/02/2019    GFR 98 06/01/2

## 2020-11-05 ENCOUNTER — OFFICE VISIT (OUTPATIENT)
Dept: FAMILY MEDICINE CLINIC | Facility: CLINIC | Age: 55
End: 2020-11-05
Payer: COMMERCIAL

## 2020-11-05 ENCOUNTER — LAB ENCOUNTER (OUTPATIENT)
Dept: LAB | Age: 55
End: 2020-11-05
Attending: FAMILY MEDICINE
Payer: COMMERCIAL

## 2020-11-05 VITALS
RESPIRATION RATE: 16 BRPM | TEMPERATURE: 98 F | HEART RATE: 80 BPM | SYSTOLIC BLOOD PRESSURE: 122 MMHG | HEIGHT: 71 IN | WEIGHT: 241 LBS | DIASTOLIC BLOOD PRESSURE: 76 MMHG | BODY MASS INDEX: 33.74 KG/M2

## 2020-11-05 DIAGNOSIS — Z11.59 SCREENING FOR VIRAL DISEASE: ICD-10-CM

## 2020-11-05 DIAGNOSIS — Z00.00 ANNUAL PHYSICAL EXAM: Primary | ICD-10-CM

## 2020-11-05 DIAGNOSIS — Z13.21 SCREENING FOR ENDOCRINE, NUTRITIONAL, METABOLIC AND IMMUNITY DISORDER: ICD-10-CM

## 2020-11-05 DIAGNOSIS — Z13.29 SCREENING FOR ENDOCRINE, NUTRITIONAL, METABOLIC AND IMMUNITY DISORDER: ICD-10-CM

## 2020-11-05 DIAGNOSIS — Z13.0 SCREENING FOR ENDOCRINE, NUTRITIONAL, METABOLIC AND IMMUNITY DISORDER: ICD-10-CM

## 2020-11-05 DIAGNOSIS — Z13.220 SCREENING FOR LIPOID DISORDERS: ICD-10-CM

## 2020-11-05 DIAGNOSIS — Z13.228 SCREENING FOR ENDOCRINE, NUTRITIONAL, METABOLIC AND IMMUNITY DISORDER: ICD-10-CM

## 2020-11-05 DIAGNOSIS — M25.552 CHRONIC LEFT HIP PAIN: ICD-10-CM

## 2020-11-05 DIAGNOSIS — H35.371 MACULAR PUCKER, RIGHT: ICD-10-CM

## 2020-11-05 DIAGNOSIS — Z13.89 SCREENING FOR GENITOURINARY CONDITION: ICD-10-CM

## 2020-11-05 DIAGNOSIS — Z13.0 SCREENING FOR DEFICIENCY ANEMIA: ICD-10-CM

## 2020-11-05 DIAGNOSIS — Z12.5 SCREENING FOR PROSTATE CANCER: ICD-10-CM

## 2020-11-05 DIAGNOSIS — Z23 NEED FOR VACCINATION: ICD-10-CM

## 2020-11-05 DIAGNOSIS — G89.29 CHRONIC LEFT HIP PAIN: ICD-10-CM

## 2020-11-05 PROCEDURE — 3008F BODY MASS INDEX DOCD: CPT | Performed by: FAMILY MEDICINE

## 2020-11-05 PROCEDURE — 99396 PREV VISIT EST AGE 40-64: CPT | Performed by: FAMILY MEDICINE

## 2020-11-05 PROCEDURE — 90750 HZV VACC RECOMBINANT IM: CPT | Performed by: FAMILY MEDICINE

## 2020-11-05 PROCEDURE — 84153 ASSAY OF PSA TOTAL: CPT | Performed by: FAMILY MEDICINE

## 2020-11-05 PROCEDURE — 90686 IIV4 VACC NO PRSV 0.5 ML IM: CPT | Performed by: FAMILY MEDICINE

## 2020-11-05 PROCEDURE — 90471 IMMUNIZATION ADMIN: CPT | Performed by: FAMILY MEDICINE

## 2020-11-05 PROCEDURE — 85025 COMPLETE CBC W/AUTO DIFF WBC: CPT | Performed by: FAMILY MEDICINE

## 2020-11-05 PROCEDURE — 81003 URINALYSIS AUTO W/O SCOPE: CPT | Performed by: FAMILY MEDICINE

## 2020-11-05 PROCEDURE — 3074F SYST BP LT 130 MM HG: CPT | Performed by: FAMILY MEDICINE

## 2020-11-05 PROCEDURE — 90472 IMMUNIZATION ADMIN EACH ADD: CPT | Performed by: FAMILY MEDICINE

## 2020-11-05 PROCEDURE — 99072 ADDL SUPL MATRL&STAF TM PHE: CPT | Performed by: FAMILY MEDICINE

## 2020-11-05 PROCEDURE — 3078F DIAST BP <80 MM HG: CPT | Performed by: FAMILY MEDICINE

## 2020-11-05 PROCEDURE — 80053 COMPREHEN METABOLIC PANEL: CPT | Performed by: FAMILY MEDICINE

## 2020-11-05 PROCEDURE — 86769 SARS-COV-2 COVID-19 ANTIBODY: CPT | Performed by: FAMILY MEDICINE

## 2020-11-05 PROCEDURE — 80061 LIPID PANEL: CPT | Performed by: FAMILY MEDICINE

## 2020-11-05 PROCEDURE — 36415 COLL VENOUS BLD VENIPUNCTURE: CPT | Performed by: FAMILY MEDICINE

## 2020-11-05 NOTE — PROGRESS NOTES
Jess Zamudio is a 60year old male who presents for a complete physical exam.   HPI:   Pt complains of nothing specific. He states his arrhythmia has been quiescent. He has a history of GERD for which he takes lansoprazole.   He uses a CPAP for his OS Release take one capsule by mouth every day as needed (Patient taking differently: Take by mouth daily.  ) 90 capsule 1   • Meloxicam 7.5 MG Oral Tab Take 1 tablet (7.5 mg total) by mouth daily.  (Patient not taking: Reported on 11/5/2020 ) 30 tablet 1 denies nasal congestion, sinus pain or ST  LUNGS: denies shortness of breath with exertion  CARDIOVASCULAR: denies chest pain on exertion  GI: denies abdominal pain,denies heartburn  : denies nocturia or changes in stream  MUSCULOSKELETAL: denies back pa This Encounter      lipids      CBC      cmp      PSA [3463]      Urinalysis, Routine      Zoster Recombinant Adjuvanted [Shingrix -Shingles] (90210)      FLULAVAL INFLUENZA VACCINE QUAD PRESERVATIVE FREE 0.5 ML      SARS-CoV-2 Total Antibody (Covid) [E]

## 2020-11-06 DIAGNOSIS — R79.89 ELEVATED LIVER FUNCTION TESTS: Primary | ICD-10-CM

## 2021-02-08 RX ORDER — VERAPAMIL HYDROCHLORIDE 240 MG/1
CAPSULE, EXTENDED RELEASE ORAL
Qty: 90 CAPSULE | Refills: 0 | Status: SHIPPED | OUTPATIENT
Start: 2021-02-08 | End: 2021-05-07

## 2021-02-26 ENCOUNTER — OFFICE VISIT (OUTPATIENT)
Dept: ORTHOPEDICS CLINIC | Facility: CLINIC | Age: 56
End: 2021-02-26
Payer: COMMERCIAL

## 2021-02-26 VITALS — DIASTOLIC BLOOD PRESSURE: 75 MMHG | SYSTOLIC BLOOD PRESSURE: 116 MMHG | HEART RATE: 78 BPM | RESPIRATION RATE: 18 BRPM

## 2021-02-26 DIAGNOSIS — M16.12 PRIMARY OSTEOARTHRITIS OF LEFT HIP: Primary | ICD-10-CM

## 2021-02-26 PROCEDURE — 3074F SYST BP LT 130 MM HG: CPT | Performed by: ORTHOPAEDIC SURGERY

## 2021-02-26 PROCEDURE — 99213 OFFICE O/P EST LOW 20 MIN: CPT | Performed by: ORTHOPAEDIC SURGERY

## 2021-02-26 PROCEDURE — 3078F DIAST BP <80 MM HG: CPT | Performed by: ORTHOPAEDIC SURGERY

## 2021-02-26 NOTE — PROGRESS NOTES
NURSING INTAKE COMMENTS: Patient presents with:  Hip Pain: Left f/u - he did nothing since the LOV - has pain rated as 2-10/10 on and off  - has a little tingling on the lateral aspect of the knee       HPI: This 60year old male presents today with compla every day as needed (Patient taking differently: Take by mouth daily.  ) 90 capsule 1       [facility]    SWELLING  History reviewed. No pertinent family history.     Social History    Occupational History      Not on file    Tobacco Use      Smok able to passively flex the hip to approximately 80 degrees. Internal rotation roughly 10 degrees with significant pain. External rotation measures 30 to 40 degrees with pain. Left knee examination unremarkable.   No pain with passive range of motion of t

## 2021-03-16 ENCOUNTER — MED REC SCAN ONLY (OUTPATIENT)
Dept: FAMILY MEDICINE CLINIC | Facility: CLINIC | Age: 56
End: 2021-03-16

## 2021-04-29 ENCOUNTER — MED REC SCAN ONLY (OUTPATIENT)
Dept: FAMILY MEDICINE CLINIC | Facility: CLINIC | Age: 56
End: 2021-04-29

## 2021-05-07 RX ORDER — VERAPAMIL HYDROCHLORIDE 240 MG/1
CAPSULE, EXTENDED RELEASE ORAL
Qty: 90 CAPSULE | Refills: 0 | Status: SHIPPED | OUTPATIENT
Start: 2021-05-07 | End: 2021-08-31

## 2021-07-13 ENCOUNTER — OFFICE VISIT (OUTPATIENT)
Dept: FAMILY MEDICINE CLINIC | Facility: CLINIC | Age: 56
End: 2021-07-13
Payer: COMMERCIAL

## 2021-07-13 VITALS
RESPIRATION RATE: 16 BRPM | WEIGHT: 240 LBS | DIASTOLIC BLOOD PRESSURE: 72 MMHG | BODY MASS INDEX: 33.6 KG/M2 | SYSTOLIC BLOOD PRESSURE: 122 MMHG | TEMPERATURE: 98 F | HEIGHT: 71 IN | HEART RATE: 68 BPM

## 2021-07-13 DIAGNOSIS — Z01.818 PREOPERATIVE CLEARANCE: Primary | ICD-10-CM

## 2021-07-13 DIAGNOSIS — I49.9 CARDIAC ARRHYTHMIA, UNSPECIFIED CARDIAC ARRHYTHMIA TYPE: ICD-10-CM

## 2021-07-13 DIAGNOSIS — R74.8 ELEVATED LIVER ENZYMES: ICD-10-CM

## 2021-07-13 DIAGNOSIS — H26.40 SECONDARY CATARACT OF RIGHT EYE, UNSPECIFIED SECONDARY CATARACT TYPE: ICD-10-CM

## 2021-07-13 PROCEDURE — 3074F SYST BP LT 130 MM HG: CPT | Performed by: FAMILY MEDICINE

## 2021-07-13 PROCEDURE — 3078F DIAST BP <80 MM HG: CPT | Performed by: FAMILY MEDICINE

## 2021-07-13 PROCEDURE — 3008F BODY MASS INDEX DOCD: CPT | Performed by: FAMILY MEDICINE

## 2021-07-13 PROCEDURE — 99243 OFF/OP CNSLTJ NEW/EST LOW 30: CPT | Performed by: FAMILY MEDICINE

## 2021-07-13 RX ORDER — PREDNISOLONE ACETATE 10 MG/ML
SUSPENSION/ DROPS OPHTHALMIC
COMMUNITY
Start: 2021-07-07 | End: 2021-09-15 | Stop reason: ALTCHOICE

## 2021-07-13 RX ORDER — OFLOXACIN 3 MG/ML
SOLUTION/ DROPS OPHTHALMIC
COMMUNITY
Start: 2021-07-07 | End: 2021-09-15 | Stop reason: ALTCHOICE

## 2021-07-13 NOTE — PROGRESS NOTES
Love Frias is a 60year old male who presents for a pre-operative physical exam. Patient is to have right cataract extraction with IOL implant, to [facility]       HPI:   Pt complains of gradual d ESOPHAGOGASTRODUODENOSCOPY (EGD); Surgeon: Shreya Veloz DO;  Location: [facility] ENDOSCOPY   • UPPER GI ENDOSCOPY,EXAM        No family history on file.    Social History:   Social History    Tobacco Use      Smoking status: Never Smoker      Smokeless tobac conditions: Cardiac dysrhythmia, CORAZON. Pt has no significant history of pulmonary conditions. Pt is a good surgical candidate. This consult was sent back the referring physician, Dr. Brock Rascon.

## 2021-07-18 ENCOUNTER — LAB ENCOUNTER (OUTPATIENT)
Dept: LAB | Facility: HOSPITAL | Age: 56
End: 2021-07-18
Attending: OPHTHALMOLOGY
Payer: COMMERCIAL

## 2021-07-18 DIAGNOSIS — Z01.818 PRE-PROCEDURAL EXAMINATION: ICD-10-CM

## 2021-07-18 LAB — SARS-COV-2 RNA RESP QL NAA+PROBE: NOT DETECTED

## 2021-08-31 RX ORDER — VERAPAMIL HYDROCHLORIDE 240 MG/1
CAPSULE, EXTENDED RELEASE ORAL
Qty: 90 CAPSULE | Refills: 0 | Status: SHIPPED | OUTPATIENT
Start: 2021-08-31 | End: 2021-11-26

## 2021-09-02 ENCOUNTER — TELEPHONE (OUTPATIENT)
Dept: FAMILY MEDICINE CLINIC | Facility: CLINIC | Age: 56
End: 2021-09-02

## 2021-09-02 DIAGNOSIS — Z01.811 PRE-OP CHEST EXAM: Primary | ICD-10-CM

## 2021-09-02 NOTE — TELEPHONE ENCOUNTER
Pt needs H&P. Pre op lab orders placed. CBC, ,CMP, U/A  W/ Relfex PT/PTT, MRSA/MSSA EKG. Papers to folder for H&P to be scheduled.

## 2021-09-23 ENCOUNTER — EKG ENCOUNTER (OUTPATIENT)
Dept: LAB | Facility: HOSPITAL | Age: 56
End: 2021-09-23
Attending: FAMILY MEDICINE
Payer: COMMERCIAL

## 2021-09-23 ENCOUNTER — OFFICE VISIT (OUTPATIENT)
Dept: FAMILY MEDICINE CLINIC | Facility: CLINIC | Age: 56
End: 2021-09-23
Payer: COMMERCIAL

## 2021-09-23 VITALS
TEMPERATURE: 98 F | BODY MASS INDEX: 33.6 KG/M2 | DIASTOLIC BLOOD PRESSURE: 82 MMHG | RESPIRATION RATE: 16 BRPM | HEART RATE: 76 BPM | HEIGHT: 71 IN | SYSTOLIC BLOOD PRESSURE: 116 MMHG | WEIGHT: 240 LBS

## 2021-09-23 DIAGNOSIS — Z01.811 PRE-OP CHEST EXAM: ICD-10-CM

## 2021-09-23 DIAGNOSIS — M16.12 PRIMARY OSTEOARTHRITIS OF LEFT HIP: ICD-10-CM

## 2021-09-23 DIAGNOSIS — Z01.818 PRE-OP EVALUATION: Primary | ICD-10-CM

## 2021-09-23 DIAGNOSIS — R74.8 ELEVATED LIVER ENZYMES: ICD-10-CM

## 2021-09-23 DIAGNOSIS — R79.89 ELEVATED LIVER FUNCTION TESTS: ICD-10-CM

## 2021-09-23 LAB
ALBUMIN SERPL-MCNC: 3.6 G/DL (ref 3.4–5)
ALBUMIN/GLOB SERPL: 0.9 {RATIO} (ref 1–2)
ALP LIVER SERPL-CCNC: 63 U/L
ALT SERPL-CCNC: 43 U/L
ANION GAP SERPL CALC-SCNC: 2 MMOL/L (ref 0–18)
ANTIBODY SCREEN: NEGATIVE
APTT PPP: 31.1 SECONDS (ref 25.4–36.1)
AST SERPL-CCNC: 21 U/L (ref 15–37)
BASOPHILS # BLD AUTO: 0.02 X10(3) UL (ref 0–0.2)
BASOPHILS NFR BLD AUTO: 0.3 %
BILIRUB DIRECT SERPL-MCNC: 0.1 MG/DL (ref 0–0.2)
BILIRUB SERPL-MCNC: 0.6 MG/DL (ref 0.1–2)
BILIRUB UR QL STRIP.AUTO: NEGATIVE
BUN BLD-MCNC: 12 MG/DL (ref 7–18)
CALCIUM BLD-MCNC: 9.3 MG/DL (ref 8.5–10.1)
CHLORIDE SERPL-SCNC: 110 MMOL/L (ref 98–112)
CLARITY UR REFRACT.AUTO: CLEAR
CO2 SERPL-SCNC: 30 MMOL/L (ref 21–32)
COLOR UR AUTO: YELLOW
CREAT BLD-MCNC: 0.85 MG/DL
EOSINOPHIL # BLD AUTO: 0.18 X10(3) UL (ref 0–0.7)
EOSINOPHIL NFR BLD AUTO: 2.6 %
ERYTHROCYTE [DISTWIDTH] IN BLOOD BY AUTOMATED COUNT: 12.7 %
GLOBULIN PLAS-MCNC: 3.8 G/DL (ref 2.8–4.4)
GLUCOSE BLD-MCNC: 85 MG/DL (ref 70–99)
GLUCOSE UR STRIP.AUTO-MCNC: NEGATIVE MG/DL
HCT VFR BLD AUTO: 45.5 %
HGB BLD-MCNC: 14.4 G/DL
IMM GRANULOCYTES # BLD AUTO: 0.03 X10(3) UL (ref 0–1)
IMM GRANULOCYTES NFR BLD: 0.4 %
INR BLD: 0.92 (ref 0.89–1.11)
KETONES UR STRIP.AUTO-MCNC: NEGATIVE MG/DL
LEUKOCYTE ESTERASE UR QL STRIP.AUTO: NEGATIVE
LYMPHOCYTES # BLD AUTO: 2.63 X10(3) UL (ref 1–4)
LYMPHOCYTES NFR BLD AUTO: 37.6 %
MCH RBC QN AUTO: 30.3 PG (ref 26–34)
MCHC RBC AUTO-ENTMCNC: 31.6 G/DL (ref 31–37)
MCV RBC AUTO: 95.6 FL
MONOCYTES # BLD AUTO: 0.57 X10(3) UL (ref 0.1–1)
MONOCYTES NFR BLD AUTO: 8.1 %
NEUTROPHILS # BLD AUTO: 3.57 X10 (3) UL (ref 1.5–7.7)
NEUTROPHILS # BLD AUTO: 3.57 X10(3) UL (ref 1.5–7.7)
NEUTROPHILS NFR BLD AUTO: 51 %
NITRITE UR QL STRIP.AUTO: NEGATIVE
OSMOLALITY SERPL CALC.SUM OF ELEC: 293 MOSM/KG (ref 275–295)
PATIENT FASTING Y/N/NP: YES
PH UR STRIP.AUTO: 6 [PH] (ref 5–8)
PLATELET # BLD AUTO: 294 10(3)UL (ref 150–450)
POTASSIUM SERPL-SCNC: 4.2 MMOL/L (ref 3.5–5.1)
PROT SERPL-MCNC: 7.4 G/DL (ref 6.4–8.2)
PROT UR STRIP.AUTO-MCNC: NEGATIVE MG/DL
PROTHROMBIN TIME: 12.6 SECONDS (ref 12.2–14.5)
RBC # BLD AUTO: 4.76 X10(6)UL
RBC UR QL AUTO: NEGATIVE
RH BLOOD TYPE: POSITIVE
SODIUM SERPL-SCNC: 142 MMOL/L (ref 136–145)
SP GR UR STRIP.AUTO: 1.02 (ref 1–1.03)
UROBILINOGEN UR STRIP.AUTO-MCNC: <2 MG/DL
WBC # BLD AUTO: 7 X10(3) UL (ref 4–11)

## 2021-09-23 PROCEDURE — 82248 BILIRUBIN DIRECT: CPT

## 2021-09-23 PROCEDURE — 85730 THROMBOPLASTIN TIME PARTIAL: CPT

## 2021-09-23 PROCEDURE — 93010 ELECTROCARDIOGRAM REPORT: CPT | Performed by: INTERNAL MEDICINE

## 2021-09-23 PROCEDURE — 93005 ELECTROCARDIOGRAM TRACING: CPT

## 2021-09-23 PROCEDURE — 86850 RBC ANTIBODY SCREEN: CPT

## 2021-09-23 PROCEDURE — 86901 BLOOD TYPING SEROLOGIC RH(D): CPT

## 2021-09-23 PROCEDURE — 87081 CULTURE SCREEN ONLY: CPT

## 2021-09-23 PROCEDURE — 3074F SYST BP LT 130 MM HG: CPT | Performed by: STUDENT IN AN ORGANIZED HEALTH CARE EDUCATION/TRAINING PROGRAM

## 2021-09-23 PROCEDURE — 80053 COMPREHEN METABOLIC PANEL: CPT

## 2021-09-23 PROCEDURE — 3079F DIAST BP 80-89 MM HG: CPT | Performed by: STUDENT IN AN ORGANIZED HEALTH CARE EDUCATION/TRAINING PROGRAM

## 2021-09-23 PROCEDURE — 99213 OFFICE O/P EST LOW 20 MIN: CPT | Performed by: STUDENT IN AN ORGANIZED HEALTH CARE EDUCATION/TRAINING PROGRAM

## 2021-09-23 PROCEDURE — 85025 COMPLETE CBC W/AUTO DIFF WBC: CPT

## 2021-09-23 PROCEDURE — 86900 BLOOD TYPING SEROLOGIC ABO: CPT

## 2021-09-23 PROCEDURE — 36415 COLL VENOUS BLD VENIPUNCTURE: CPT

## 2021-09-23 PROCEDURE — 85610 PROTHROMBIN TIME: CPT

## 2021-09-23 PROCEDURE — 3008F BODY MASS INDEX DOCD: CPT | Performed by: STUDENT IN AN ORGANIZED HEALTH CARE EDUCATION/TRAINING PROGRAM

## 2021-09-23 PROCEDURE — 81003 URINALYSIS AUTO W/O SCOPE: CPT

## 2021-09-23 RX ORDER — OMEPRAZOLE 20 MG/1
20 CAPSULE, DELAYED RELEASE ORAL DAILY PRN
COMMUNITY

## 2021-09-23 NOTE — PROGRESS NOTES
Tarun Sotomayor is a 60year old male with a hx of left hip primary osteoarthritis, who presents for a pre-operative physical exam. Patient is to have left total hips arthroplasty to be done by Dr. Ernie Tucker at [facility] on 10/06 pain/belching    • Heart palpitations    • Hemorrhoids    • Ingrowing nail    • Irregular bowel habits    • Kidney stones     10/2016 seen at [address]    • Migraines    • Osteoarthritis     bilateral hips   • Pain in joint, shoulder region    • Sleep a kg)   BMI 33.47 kg/m²      GENERAL: well developed, well nourished,in no apparent distress  SKIN: no rashes  HEENT: atraumatic, normocephalic,ears and throat are clear  EYES: PERRLA, conjunctivae clear   NECK:  supple,no adenopathy,no bruits  LUNGS: clear

## 2021-09-23 NOTE — PATIENT INSTRUCTIONS
Total Hip Replacement  Total hip replacement surgery almost always reduces joint pain. During this surgery, your problem hip joint is replaced with an artificial joint, called a prosthesis.   Benefits of hip replacement  Total hip replacement surgery lesli stitches. Preparing the bone  The hip is a ball-and-socket joint. The ball is cut from the thighbone, and the surface of the old socket is smoothed. Then the new socket is put into the pelvis.  The socket is usually press-fit and may be held in place with

## 2021-09-27 ENCOUNTER — TELEPHONE (OUTPATIENT)
Dept: FAMILY MEDICINE CLINIC | Facility: CLINIC | Age: 56
End: 2021-09-27

## 2021-09-27 LAB
ATRIAL RATE: 63 BPM
P AXIS: 36 DEGREES
P-R INTERVAL: 176 MS
Q-T INTERVAL: 412 MS
QRS DURATION: 94 MS
QTC CALCULATION (BEZET): 421 MS
R AXIS: -9 DEGREES
T AXIS: 10 DEGREES
VENTRICULAR RATE: 63 BPM

## 2021-09-27 NOTE — TELEPHONE ENCOUNTER
Called to inform of negative MSSA and MRSA cultures (ordered as part of his pre-op labs prior to scheduled left DANAE on 10/6/21). No answer, will attempt to contact again.      Alec Renteria MD, 09/27/21, 3:36 PM

## 2021-09-27 NOTE — TELEPHONE ENCOUNTER
Called back patient and informed of negative MRSA and MSSA culture. Will fax over all pre-op labs, H&P, and required medical clearance to Dr. Valene Leventhal office (ortho). Patient has no further questions at this time.      Alec Renteria MD, 09/27/21, 6:38 P

## 2021-10-03 ENCOUNTER — LAB ENCOUNTER (OUTPATIENT)
Dept: LAB | Facility: HOSPITAL | Age: 56
End: 2021-10-03
Attending: ORTHOPAEDIC SURGERY
Payer: COMMERCIAL

## 2021-10-03 DIAGNOSIS — M16.12 PRIMARY OSTEOARTHRITIS OF LEFT HIP: ICD-10-CM

## 2021-10-05 NOTE — H&P
[address] Patient Status:  Hospital Outpatient Surgery    3/12/1965 MRN [id number]   [facility] SURGERY Attending Patito Flores MD   Hosp Day # 0 PCP Rosina Love MD     Date of Admis LMTCB-- Please let patient know orders are in and schedule a lab appointment    reports that he has never smoked. He has never used smokeless tobacco. He reports current alcohol use of about 1.0 standard drink of alcohol per week. He reports that he does not use drugs.     Allergies:    [facility] [address] [place] [address]

## 2021-10-06 ENCOUNTER — APPOINTMENT (OUTPATIENT)
Dept: GENERAL RADIOLOGY | Facility: HOSPITAL | Age: 56
End: 2021-10-06
Attending: ORTHOPAEDIC SURGERY
Payer: COMMERCIAL

## 2021-10-06 ENCOUNTER — ANESTHESIA EVENT (OUTPATIENT)
Dept: SURGERY | Facility: HOSPITAL | Age: 56
End: 2021-10-06
Payer: COMMERCIAL

## 2021-10-06 ENCOUNTER — ANESTHESIA (OUTPATIENT)
Dept: SURGERY | Facility: HOSPITAL | Age: 56
End: 2021-10-06
Payer: COMMERCIAL

## 2021-10-06 ENCOUNTER — HOSPITAL ENCOUNTER (OUTPATIENT)
Facility: HOSPITAL | Age: 56
Setting detail: HOSPITAL OUTPATIENT SURGERY
Discharge: HOME HEALTH | End: 2021-10-06
Attending: ORTHOPAEDIC SURGERY | Admitting: ORTHOPAEDIC SURGERY
Payer: COMMERCIAL

## 2021-10-06 VITALS
BODY MASS INDEX: 33.52 KG/M2 | HEART RATE: 79 BPM | TEMPERATURE: 97 F | HEIGHT: 71 IN | SYSTOLIC BLOOD PRESSURE: 112 MMHG | OXYGEN SATURATION: 93 % | RESPIRATION RATE: 18 BRPM | DIASTOLIC BLOOD PRESSURE: 70 MMHG | WEIGHT: 239.44 LBS

## 2021-10-06 DIAGNOSIS — M16.12 PRIMARY OSTEOARTHRITIS OF LEFT HIP: Primary | ICD-10-CM

## 2021-10-06 PROCEDURE — 0SRB03A REPLACEMENT OF LEFT HIP JOINT WITH CERAMIC SYNTHETIC SUBSTITUTE, UNCEMENTED, OPEN APPROACH: ICD-10-PCS | Performed by: ORTHOPAEDIC SURGERY

## 2021-10-06 PROCEDURE — 88304 TISSUE EXAM BY PATHOLOGIST: CPT | Performed by: ORTHOPAEDIC SURGERY

## 2021-10-06 PROCEDURE — 88311 DECALCIFY TISSUE: CPT | Performed by: ORTHOPAEDIC SURGERY

## 2021-10-06 PROCEDURE — 97161 PT EVAL LOW COMPLEX 20 MIN: CPT

## 2021-10-06 PROCEDURE — 73501 X-RAY EXAM HIP UNI 1 VIEW: CPT | Performed by: ORTHOPAEDIC SURGERY

## 2021-10-06 PROCEDURE — 97116 GAIT TRAINING THERAPY: CPT

## 2021-10-06 DEVICE — PINNACLE POROCOAT ACETABULAR SHELL SECTOR II 56MM OD
Type: IMPLANTABLE DEVICE | Site: HIP | Status: FUNCTIONAL
Brand: PINNACLE POROCOAT

## 2021-10-06 DEVICE — CORAIL HIP SYSTEM CEMENTLESS FEMORAL STEM 12/14 AMT 135 DEGREES KHO SIZE 12 HA COATED HIGH OFFSET NO COLLAR
Type: IMPLANTABLE DEVICE | Site: HIP | Status: FUNCTIONAL
Brand: CORAIL

## 2021-10-06 DEVICE — PINNACLE CANCELLOUS BONE SCREW 6.5MM X 25MM
Type: IMPLANTABLE DEVICE | Site: HIP | Status: FUNCTIONAL
Brand: PINNACLE

## 2021-10-06 DEVICE — BIOLOX DELTA CERAMIC FEMORAL HEAD +5.0 36MM DIA 12/14 TAPER
Type: IMPLANTABLE DEVICE | Site: HIP | Status: FUNCTIONAL
Brand: BIOLOX DELTA

## 2021-10-06 DEVICE — PINNACLE HIP SOLUTIONS ALTRX POLYETHYLENE ACETABULAR LINER NEUTRAL 36MM ID 56MM OD
Type: IMPLANTABLE DEVICE | Site: HIP | Status: FUNCTIONAL
Brand: PINNACLE ALTRX

## 2021-10-06 DEVICE — APEX HOLE ELIMINATOR - PS
Type: IMPLANTABLE DEVICE | Site: HIP | Status: FUNCTIONAL
Brand: APEX

## 2021-10-06 RX ORDER — OXYCODONE HYDROCHLORIDE 5 MG/1
10 TABLET ORAL EVERY 4 HOURS PRN
Status: DISCONTINUED | OUTPATIENT
Start: 2021-10-06 | End: 2021-10-06

## 2021-10-06 RX ORDER — SODIUM CHLORIDE, SODIUM LACTATE, POTASSIUM CHLORIDE, CALCIUM CHLORIDE 600; 310; 30; 20 MG/100ML; MG/100ML; MG/100ML; MG/100ML
INJECTION, SOLUTION INTRAVENOUS CONTINUOUS
Status: DISCONTINUED | OUTPATIENT
Start: 2021-10-06 | End: 2021-10-06

## 2021-10-06 RX ORDER — POLYETHYLENE GLYCOL 3350 17 G/17G
17 POWDER, FOR SOLUTION ORAL DAILY PRN
Status: CANCELLED | OUTPATIENT
Start: 2021-10-06

## 2021-10-06 RX ORDER — SCOLOPAMINE TRANSDERMAL SYSTEM 1 MG/1
PATCH, EXTENDED RELEASE TRANSDERMAL
Status: DISCONTINUED
Start: 2021-10-06 | End: 2021-10-06

## 2021-10-06 RX ORDER — HYDROMORPHONE HYDROCHLORIDE 1 MG/ML
0.4 INJECTION, SOLUTION INTRAMUSCULAR; INTRAVENOUS; SUBCUTANEOUS EVERY 5 MIN PRN
Status: DISCONTINUED | OUTPATIENT
Start: 2021-10-06 | End: 2021-10-06

## 2021-10-06 RX ORDER — ACETAMINOPHEN 325 MG/1
TABLET ORAL
Status: COMPLETED
Start: 2021-10-06 | End: 2021-10-06

## 2021-10-06 RX ORDER — DIPHENHYDRAMINE HYDROCHLORIDE 50 MG/ML
12.5 INJECTION INTRAMUSCULAR; INTRAVENOUS EVERY 4 HOURS PRN
Status: CANCELLED | OUTPATIENT
Start: 2021-10-06

## 2021-10-06 RX ORDER — HYDROMORPHONE HYDROCHLORIDE 1 MG/ML
0.8 INJECTION, SOLUTION INTRAMUSCULAR; INTRAVENOUS; SUBCUTANEOUS EVERY 2 HOUR PRN
Status: DISCONTINUED | OUTPATIENT
Start: 2021-10-06 | End: 2021-10-06

## 2021-10-06 RX ORDER — CEFAZOLIN SODIUM/WATER 2 G/20 ML
2 SYRINGE (ML) INTRAVENOUS EVERY 8 HOURS
Status: CANCELLED | OUTPATIENT
Start: 2021-10-06 | End: 2021-10-06

## 2021-10-06 RX ORDER — ACETAMINOPHEN 500 MG
1000 TABLET ORAL ONCE AS NEEDED
Status: COMPLETED | OUTPATIENT
Start: 2021-10-06 | End: 2021-10-06

## 2021-10-06 RX ORDER — HYDROMORPHONE HYDROCHLORIDE 1 MG/ML
0.4 INJECTION, SOLUTION INTRAMUSCULAR; INTRAVENOUS; SUBCUTANEOUS EVERY 2 HOUR PRN
Status: DISCONTINUED | OUTPATIENT
Start: 2021-10-06 | End: 2021-10-06

## 2021-10-06 RX ORDER — ONDANSETRON 2 MG/ML
4 INJECTION INTRAMUSCULAR; INTRAVENOUS EVERY 4 HOURS PRN
Status: CANCELLED | OUTPATIENT
Start: 2021-10-06 | End: 2021-10-08

## 2021-10-06 RX ORDER — OXYCODONE HYDROCHLORIDE 5 MG/1
5 TABLET ORAL EVERY 4 HOURS PRN
Status: DISCONTINUED | OUTPATIENT
Start: 2021-10-06 | End: 2021-10-06

## 2021-10-06 RX ORDER — PHENYLEPHRINE HCL 10 MG/ML
VIAL (ML) INJECTION AS NEEDED
Status: DISCONTINUED | OUTPATIENT
Start: 2021-10-06 | End: 2021-10-06 | Stop reason: SURG

## 2021-10-06 RX ORDER — BISACODYL 10 MG
10 SUPPOSITORY, RECTAL RECTAL
Status: CANCELLED | OUTPATIENT
Start: 2021-10-06

## 2021-10-06 RX ORDER — ZOLPIDEM TARTRATE 5 MG/1
5 TABLET ORAL NIGHTLY PRN
Status: CANCELLED | OUTPATIENT
Start: 2021-10-06

## 2021-10-06 RX ORDER — SODIUM PHOSPHATE, DIBASIC AND SODIUM PHOSPHATE, MONOBASIC 7; 19 G/133ML; G/133ML
1 ENEMA RECTAL ONCE AS NEEDED
Status: CANCELLED | OUTPATIENT
Start: 2021-10-06

## 2021-10-06 RX ORDER — DEXAMETHASONE SODIUM PHOSPHATE 10 MG/ML
8 INJECTION, SOLUTION INTRAMUSCULAR; INTRAVENOUS ONCE
Status: DISCONTINUED | OUTPATIENT
Start: 2021-10-07 | End: 2021-10-06

## 2021-10-06 RX ORDER — DIPHENHYDRAMINE HYDROCHLORIDE 50 MG/ML
25 INJECTION INTRAMUSCULAR; INTRAVENOUS ONCE AS NEEDED
Status: CANCELLED | OUTPATIENT
Start: 2021-10-06 | End: 2021-10-06

## 2021-10-06 RX ORDER — CEFAZOLIN SODIUM/WATER 2 G/20 ML
2 SYRINGE (ML) INTRAVENOUS ONCE
Status: COMPLETED | OUTPATIENT
Start: 2021-10-06 | End: 2021-10-06

## 2021-10-06 RX ORDER — TIZANIDINE 2 MG/1
2 TABLET ORAL EVERY 6 HOURS PRN
Status: DISCONTINUED | OUTPATIENT
Start: 2021-10-06 | End: 2021-10-06

## 2021-10-06 RX ORDER — TRANEXAMIC ACID 10 MG/ML
1000 INJECTION, SOLUTION INTRAVENOUS ONCE
Status: COMPLETED | OUTPATIENT
Start: 2021-10-06 | End: 2021-10-06

## 2021-10-06 RX ORDER — ASPIRIN 325 MG
325 TABLET, DELAYED RELEASE (ENTERIC COATED) ORAL 2 TIMES DAILY
Status: CANCELLED | OUTPATIENT
Start: 2021-10-06

## 2021-10-06 RX ORDER — KETOROLAC TROMETHAMINE 30 MG/ML
INJECTION, SOLUTION INTRAMUSCULAR; INTRAVENOUS AS NEEDED
Status: DISCONTINUED | OUTPATIENT
Start: 2021-10-06 | End: 2021-10-06 | Stop reason: SURG

## 2021-10-06 RX ORDER — DOCUSATE SODIUM 100 MG/1
100 CAPSULE, LIQUID FILLED ORAL 2 TIMES DAILY
Status: CANCELLED | OUTPATIENT
Start: 2021-10-06

## 2021-10-06 RX ORDER — DEXAMETHASONE SODIUM PHOSPHATE 4 MG/ML
VIAL (ML) INJECTION AS NEEDED
Status: DISCONTINUED | OUTPATIENT
Start: 2021-10-06 | End: 2021-10-06 | Stop reason: SURG

## 2021-10-06 RX ORDER — PROCHLORPERAZINE EDISYLATE 5 MG/ML
10 INJECTION INTRAMUSCULAR; INTRAVENOUS EVERY 6 HOURS PRN
Status: CANCELLED | OUTPATIENT
Start: 2021-10-06 | End: 2021-10-08

## 2021-10-06 RX ORDER — NALOXONE HYDROCHLORIDE 0.4 MG/ML
80 INJECTION, SOLUTION INTRAMUSCULAR; INTRAVENOUS; SUBCUTANEOUS AS NEEDED
Status: DISCONTINUED | OUTPATIENT
Start: 2021-10-06 | End: 2021-10-06

## 2021-10-06 RX ORDER — MIDAZOLAM HYDROCHLORIDE 1 MG/ML
INJECTION INTRAMUSCULAR; INTRAVENOUS AS NEEDED
Status: DISCONTINUED | OUTPATIENT
Start: 2021-10-06 | End: 2021-10-06 | Stop reason: SURG

## 2021-10-06 RX ORDER — KETAMINE HYDROCHLORIDE 50 MG/ML
INJECTION, SOLUTION, CONCENTRATE INTRAMUSCULAR; INTRAVENOUS AS NEEDED
Status: DISCONTINUED | OUTPATIENT
Start: 2021-10-06 | End: 2021-10-06 | Stop reason: SURG

## 2021-10-06 RX ORDER — ONDANSETRON 2 MG/ML
4 INJECTION INTRAMUSCULAR; INTRAVENOUS AS NEEDED
Status: DISCONTINUED | OUTPATIENT
Start: 2021-10-06 | End: 2021-10-06

## 2021-10-06 RX ORDER — SCOLOPAMINE TRANSDERMAL SYSTEM 1 MG/1
1 PATCH, EXTENDED RELEASE TRANSDERMAL ONCE
Status: DISCONTINUED | OUTPATIENT
Start: 2021-10-06 | End: 2021-10-06

## 2021-10-06 RX ORDER — MEPERIDINE HYDROCHLORIDE 25 MG/ML
12.5 INJECTION INTRAMUSCULAR; INTRAVENOUS; SUBCUTANEOUS AS NEEDED
Status: DISCONTINUED | OUTPATIENT
Start: 2021-10-06 | End: 2021-10-06

## 2021-10-06 RX ORDER — SENNOSIDES 8.6 MG
17.2 TABLET ORAL NIGHTLY
Status: CANCELLED | OUTPATIENT
Start: 2021-10-06

## 2021-10-06 RX ORDER — ACETAMINOPHEN 500 MG
1000 TABLET ORAL ONCE
COMMUNITY
End: 2021-11-19 | Stop reason: ALTCHOICE

## 2021-10-06 RX ORDER — BUPIVACAINE HYDROCHLORIDE 7.5 MG/ML
INJECTION, SOLUTION INTRASPINAL AS NEEDED
Status: DISCONTINUED | OUTPATIENT
Start: 2021-10-06 | End: 2021-10-06 | Stop reason: SURG

## 2021-10-06 RX ORDER — ACETAMINOPHEN 500 MG
1000 TABLET ORAL ONCE
Status: DISCONTINUED | OUTPATIENT
Start: 2021-10-06 | End: 2021-10-06

## 2021-10-06 RX ORDER — ACETAMINOPHEN 325 MG/1
650 TABLET ORAL 4 TIMES DAILY
Status: DISCONTINUED | OUTPATIENT
Start: 2021-10-06 | End: 2021-10-06

## 2021-10-06 RX ORDER — CEFAZOLIN SODIUM/WATER 2 G/20 ML
SYRINGE (ML) INTRAVENOUS
Status: DISCONTINUED
Start: 2021-10-06 | End: 2021-10-06

## 2021-10-06 RX ORDER — ONDANSETRON 2 MG/ML
INJECTION INTRAMUSCULAR; INTRAVENOUS AS NEEDED
Status: DISCONTINUED | OUTPATIENT
Start: 2021-10-06 | End: 2021-10-06 | Stop reason: SURG

## 2021-10-06 RX ORDER — DIPHENHYDRAMINE HCL 25 MG
25 CAPSULE ORAL EVERY 4 HOURS PRN
Status: CANCELLED | OUTPATIENT
Start: 2021-10-06

## 2021-10-06 RX ADMIN — CEFAZOLIN SODIUM/WATER 2 G: 2 G/20 ML SYRINGE (ML) INTRAVENOUS at 07:14:00

## 2021-10-06 RX ADMIN — SODIUM CHLORIDE, SODIUM LACTATE, POTASSIUM CHLORIDE, CALCIUM CHLORIDE: 600; 310; 30; 20 INJECTION, SOLUTION INTRAVENOUS at 08:20:00

## 2021-10-06 RX ADMIN — TRANEXAMIC ACID 1000 MG: 10 INJECTION, SOLUTION INTRAVENOUS at 07:07:00

## 2021-10-06 RX ADMIN — MIDAZOLAM HYDROCHLORIDE 2 MG: 1 INJECTION INTRAMUSCULAR; INTRAVENOUS at 06:58:00

## 2021-10-06 RX ADMIN — BUPIVACAINE HYDROCHLORIDE 1.2 ML: 7.5 INJECTION, SOLUTION INTRASPINAL at 07:05:00

## 2021-10-06 RX ADMIN — ONDANSETRON 4 MG: 2 INJECTION INTRAMUSCULAR; INTRAVENOUS at 08:07:00

## 2021-10-06 RX ADMIN — DEXAMETHASONE SODIUM PHOSPHATE 8 MG: 4 MG/ML VIAL (ML) INJECTION at 07:25:00

## 2021-10-06 RX ADMIN — KETAMINE HYDROCHLORIDE 25 MG: 50 INJECTION, SOLUTION, CONCENTRATE INTRAMUSCULAR; INTRAVENOUS at 07:37:00

## 2021-10-06 RX ADMIN — KETOROLAC TROMETHAMINE 30 MG: 30 INJECTION, SOLUTION INTRAMUSCULAR; INTRAVENOUS at 08:06:00

## 2021-10-06 RX ADMIN — PHENYLEPHRINE HCL 100 MCG: 10 MG/ML VIAL (ML) INJECTION at 07:31:00

## 2021-10-06 RX ADMIN — KETAMINE HYDROCHLORIDE 25 MG: 50 INJECTION, SOLUTION, CONCENTRATE INTRAMUSCULAR; INTRAVENOUS at 07:12:00

## 2021-10-06 RX ADMIN — SODIUM CHLORIDE, SODIUM LACTATE, POTASSIUM CHLORIDE, CALCIUM CHLORIDE: 600; 310; 30; 20 INJECTION, SOLUTION INTRAVENOUS at 07:05:00

## 2021-10-06 NOTE — INTERVAL H&P NOTE
Pre-op Diagnosis: Primary osteoarthritis of left hip [M16.12]    The above referenced H&P was reviewed by Beckie Judd MD on 10/6/2021, the patient was examined and no significant changes have occurred in the patient's condition since the H&P was perfo

## 2021-10-06 NOTE — PHYSICAL THERAPY NOTE
PHYSICAL THERAPY QUICK EVALUATION - INPATIENT    Room Number: 1972  Evaluation Date: 10/6/2021  Presenting Problem: s/p L anterior DANAE 10/6/21  Physician Order: PT Eval and Treat    Problem List  Active Problems:    * No active hospital problems.  * thought it would be. \"    OBJECTIVE  Precautions: DANAE - anterior  Fall Risk: Standard fall risk    WEIGHT BEARING RESTRICTION  Weight Bearing Restriction: L lower extremity           L Lower Extremity: Weight Bearing as Tolerated    PAIN ASSESSMENT  Rating supervision. Pt progressing from step to to step through gait pattern. Pt given VC for sequencing. Pt ambulates with decreased stance time on L LE. Pt ascended/descended 4 stairs with step to pattern and supervision. Pt educated on sequencing.  Pt returned

## 2021-10-06 NOTE — ADDENDUM NOTE
Addendum  created 10/06/21 0916 by Baldo Youngblood MD    Child order released for a procedure order, Clinical Note Signed, Intraprocedure Blocks edited

## 2021-10-06 NOTE — ANESTHESIA PROCEDURE NOTES
Spinal Block  Performed by: Georgina Schilling MD  Authorized by: Georgina Schilling MD       General Information and Staff    Start Time:  10/6/2021 7:00 AM  End Time:  10/6/2021 7:10 AM  Anesthesiologist:  Georgina Schilling MD  Performed by:   Anesthesiologist  Site

## 2021-10-06 NOTE — ANESTHESIA POSTPROCEDURE EVALUATION
[address] [facility] Patient Status:  Hospital Outpatient Surgery   Age/Gender 60year old male MRN [id number]   [facility] SURGERY Attending Brandon Quintero MD   Hosp Day # 0 PCP Dilshad Curtis MD       Anesthesia Post-o

## 2021-10-06 NOTE — HOME CARE LIAISON
This patient is is a pre-op patient with [address]. [facility] will follow at DC. RAYSHAWN rene, RN/PT.

## 2021-10-06 NOTE — OPERATIVE REPORT
DATE OF PROCEDURE:  10/6/2021  PREOPERATIVE DIAGNOSIS: Severe osteoarthritis left hip. POSTOPERATIVE DIAGNOSIS: Severe osteoarthritis left hip. PROCEDURE PERFORMED: Non-cemented left total hip arthroplasty. SURGEON:  MARII Woodard ASSIST mechanism and the anterior fifth of the vastus lateralis were reflected anteriorly for later repair. An anterior capsulotomy was performed, and the hip was dislocated. There were severe arthritic changes on both the femoral and acetabular sides.  A femoral The vastus lateralis was similarly repaired. The fascia was closed with a running barbed suture. Subcutaneous tissue was closed with inverted 2-0 Vicryl. Skin was closed with staples. An aquacell dressing plus gauze covering was applied.  The patient was p

## 2021-10-06 NOTE — CM/SW NOTE
10/06/21 1100   CM/SW Referral Data   Referral Source Nurse   Reason for Referral Discharge planning   Discharge Needs   Anticipated D/C needs Home health care; To be determined;  Outpatient therapy       Received call from pt's RN in [facility] Day Surgery wh

## 2021-10-06 NOTE — ANESTHESIA PREPROCEDURE EVALUATION
PRE-OP EVALUATION    Patient Name: Gayatri Vitale    Admit Diagnosis: Primary osteoarthritis of left hip [M16.12]    Pre-op Diagnosis: Primary osteoarthritis of left hip [M16.12]    LEFT TOTAL HIP ARTHROPLASTY     Anesthesia Procedure: LEFT TOTAL HIP AR (+) sleep apnea       Neuro/Psych    Negative neuro/psych ROS.                                 Past Surgical History:   Procedure Laterality Date   • CATARACT      right   • CHOLECYSTECTOMY  2001   • COLONOSCOPY     • COLONOSCOPY N/A 7/2/2015    Procedure infection that can cause a hematoma or abscess that can cause serious and permanent consequences. The spinal injection can cause a headache. The spinal injection may not work as well as we like or it may start to wear off.  In those situations we have gener

## 2021-11-16 ENCOUNTER — MED REC SCAN ONLY (OUTPATIENT)
Dept: FAMILY MEDICINE CLINIC | Facility: CLINIC | Age: 56
End: 2021-11-16

## 2021-11-26 RX ORDER — VERAPAMIL HYDROCHLORIDE 240 MG/1
CAPSULE, EXTENDED RELEASE ORAL
Qty: 90 CAPSULE | Refills: 0 | Status: SHIPPED | OUTPATIENT
Start: 2021-11-26

## 2021-11-26 RX ORDER — VERAPAMIL HYDROCHLORIDE 240 MG/1
CAPSULE, EXTENDED RELEASE ORAL
Qty: 90 CAPSULE | Refills: 0 | OUTPATIENT
Start: 2021-11-26

## 2022-03-10 RX ORDER — VERAPAMIL HYDROCHLORIDE 240 MG/1
CAPSULE, EXTENDED RELEASE ORAL
Qty: 90 CAPSULE | Refills: 0 | Status: SHIPPED | OUTPATIENT
Start: 2022-03-10

## 2022-03-10 NOTE — TELEPHONE ENCOUNTER
Hypertension Medications Protocol Passed 03/09/2022 08:38 AM   Protocol Details  CMP or BMP in past 12 months    Last serum creatinine< 2.0    Appointment in past 6 or next 3 months     LOV 7/13/21     LAST LAB  9/23/21     LAST RX 11/26/21 90 caps     Next OV   Future Appointments   Date Time Provider Tom Storey   10/6/2022  2:30 PM Med Romero MD [facility] NP ORTHO [facility]         PROTOCOL pass

## 2022-06-06 RX ORDER — VERAPAMIL HYDROCHLORIDE 240 MG/1
CAPSULE, EXTENDED RELEASE ORAL
Qty: 30 CAPSULE | Refills: 0 | Status: SHIPPED | OUTPATIENT
Start: 2022-06-06

## 2022-06-06 NOTE — TELEPHONE ENCOUNTER
LOV: 9/23/21 (-pre-op)  Last Refill:  3/10/22, #90, 0 RF  Next OV: n/a    Protocol failed.      [facility] sent to pt to schedule annual exam.

## 2022-07-15 RX ORDER — VERAPAMIL HYDROCHLORIDE 240 MG/1
CAPSULE, EXTENDED RELEASE ORAL
Qty: 30 CAPSULE | Refills: 0 | Status: SHIPPED | OUTPATIENT
Start: 2022-07-15

## 2022-08-11 ENCOUNTER — OFFICE VISIT (OUTPATIENT)
Dept: FAMILY MEDICINE CLINIC | Facility: CLINIC | Age: 57
End: 2022-08-11
Payer: COMMERCIAL

## 2022-08-11 VITALS
RESPIRATION RATE: 16 BRPM | HEIGHT: 71 IN | HEART RATE: 66 BPM | OXYGEN SATURATION: 98 % | DIASTOLIC BLOOD PRESSURE: 78 MMHG | WEIGHT: 243 LBS | TEMPERATURE: 98 F | BODY MASS INDEX: 34.02 KG/M2 | SYSTOLIC BLOOD PRESSURE: 112 MMHG

## 2022-08-11 DIAGNOSIS — Z12.5 SCREENING FOR PROSTATE CANCER: ICD-10-CM

## 2022-08-11 DIAGNOSIS — Z13.228 SCREENING FOR ENDOCRINE, NUTRITIONAL, METABOLIC AND IMMUNITY DISORDER: ICD-10-CM

## 2022-08-11 DIAGNOSIS — K40.90 RIGHT INGUINAL HERNIA: ICD-10-CM

## 2022-08-11 DIAGNOSIS — Z12.11 SCREENING FOR COLON CANCER: ICD-10-CM

## 2022-08-11 DIAGNOSIS — Z00.00 ANNUAL PHYSICAL EXAM: Primary | ICD-10-CM

## 2022-08-11 DIAGNOSIS — K42.9 UMBILICAL HERNIA WITHOUT OBSTRUCTION AND WITHOUT GANGRENE: ICD-10-CM

## 2022-08-11 DIAGNOSIS — L98.9 SCALP LESION: ICD-10-CM

## 2022-08-11 DIAGNOSIS — Z13.0 SCREENING FOR DEFICIENCY ANEMIA: ICD-10-CM

## 2022-08-11 DIAGNOSIS — Z13.29 SCREENING FOR ENDOCRINE, NUTRITIONAL, METABOLIC AND IMMUNITY DISORDER: ICD-10-CM

## 2022-08-11 DIAGNOSIS — Z13.0 SCREENING FOR ENDOCRINE, NUTRITIONAL, METABOLIC AND IMMUNITY DISORDER: ICD-10-CM

## 2022-08-11 DIAGNOSIS — Z13.21 SCREENING FOR ENDOCRINE, NUTRITIONAL, METABOLIC AND IMMUNITY DISORDER: ICD-10-CM

## 2022-08-11 DIAGNOSIS — Z13.220 SCREENING FOR LIPOID DISORDERS: ICD-10-CM

## 2022-08-11 PROCEDURE — 3078F DIAST BP <80 MM HG: CPT | Performed by: FAMILY MEDICINE

## 2022-08-11 PROCEDURE — 3008F BODY MASS INDEX DOCD: CPT | Performed by: FAMILY MEDICINE

## 2022-08-11 PROCEDURE — 3074F SYST BP LT 130 MM HG: CPT | Performed by: FAMILY MEDICINE

## 2022-08-11 PROCEDURE — 99396 PREV VISIT EST AGE 40-64: CPT | Performed by: FAMILY MEDICINE

## 2022-08-17 RX ORDER — VERAPAMIL HYDROCHLORIDE 240 MG/1
CAPSULE, EXTENDED RELEASE ORAL NIGHTLY
Refills: 0 | OUTPATIENT
Start: 2022-08-17

## 2022-08-17 RX ORDER — VERAPAMIL HYDROCHLORIDE 240 MG/1
CAPSULE, EXTENDED RELEASE ORAL
Qty: 90 CAPSULE | Refills: 1 | Status: SHIPPED | OUTPATIENT
Start: 2022-08-17 | End: 2023-02-20

## 2022-10-06 ENCOUNTER — LAB ENCOUNTER (OUTPATIENT)
Dept: LAB | Age: 57
End: 2022-10-06
Attending: FAMILY MEDICINE
Payer: COMMERCIAL

## 2022-10-06 DIAGNOSIS — Z13.29 SCREENING FOR ENDOCRINE, NUTRITIONAL, METABOLIC AND IMMUNITY DISORDER: ICD-10-CM

## 2022-10-06 DIAGNOSIS — Z13.220 SCREENING FOR LIPOID DISORDERS: ICD-10-CM

## 2022-10-06 DIAGNOSIS — Z13.0 SCREENING FOR DEFICIENCY ANEMIA: ICD-10-CM

## 2022-10-06 DIAGNOSIS — Z13.228 SCREENING FOR ENDOCRINE, NUTRITIONAL, METABOLIC AND IMMUNITY DISORDER: ICD-10-CM

## 2022-10-06 DIAGNOSIS — Z13.0 SCREENING FOR ENDOCRINE, NUTRITIONAL, METABOLIC AND IMMUNITY DISORDER: ICD-10-CM

## 2022-10-06 DIAGNOSIS — Z12.5 SCREENING FOR PROSTATE CANCER: ICD-10-CM

## 2022-10-06 DIAGNOSIS — Z13.21 SCREENING FOR ENDOCRINE, NUTRITIONAL, METABOLIC AND IMMUNITY DISORDER: ICD-10-CM

## 2022-10-06 LAB
ALBUMIN SERPL-MCNC: 3.9 G/DL (ref 3.4–5)
ALBUMIN/GLOB SERPL: 1.1 {RATIO} (ref 1–2)
ALP LIVER SERPL-CCNC: 60 U/L
ALT SERPL-CCNC: 53 U/L
ANION GAP SERPL CALC-SCNC: 7 MMOL/L (ref 0–18)
AST SERPL-CCNC: 24 U/L (ref 15–37)
BASOPHILS # BLD AUTO: 0.02 X10(3) UL (ref 0–0.2)
BASOPHILS NFR BLD AUTO: 0.3 %
BILIRUB SERPL-MCNC: 0.6 MG/DL (ref 0.1–2)
BUN BLD-MCNC: 17 MG/DL (ref 7–18)
BUN/CREAT SERPL: 18.9 (ref 10–20)
CALCIUM BLD-MCNC: 9.2 MG/DL (ref 8.5–10.1)
CHLORIDE SERPL-SCNC: 109 MMOL/L (ref 98–112)
CHOLEST SERPL-MCNC: 166 MG/DL (ref ?–200)
CO2 SERPL-SCNC: 26 MMOL/L (ref 21–32)
CREAT BLD-MCNC: 0.9 MG/DL
DEPRECATED RDW RBC AUTO: 45.1 FL (ref 35.1–46.3)
EOSINOPHIL # BLD AUTO: 0.16 X10(3) UL (ref 0–0.7)
EOSINOPHIL NFR BLD AUTO: 2.6 %
ERYTHROCYTE [DISTWIDTH] IN BLOOD BY AUTOMATED COUNT: 13 % (ref 11–15)
FASTING PATIENT LIPID ANSWER: YES
FASTING STATUS PATIENT QL REPORTED: YES
GFR SERPLBLD BASED ON 1.73 SQ M-ARVRAT: 98 ML/MIN/1.73M2 (ref 60–?)
GLOBULIN PLAS-MCNC: 3.4 G/DL (ref 2.8–4.4)
GLUCOSE BLD-MCNC: 89 MG/DL (ref 70–99)
HCT VFR BLD AUTO: 46.5 %
HDLC SERPL-MCNC: 51 MG/DL (ref 40–59)
HGB BLD-MCNC: 15.2 G/DL
IMM GRANULOCYTES # BLD AUTO: 0.02 X10(3) UL (ref 0–1)
IMM GRANULOCYTES NFR BLD: 0.3 %
LDLC SERPL CALC-MCNC: 102 MG/DL (ref ?–100)
LYMPHOCYTES # BLD AUTO: 2.24 X10(3) UL (ref 1–4)
LYMPHOCYTES NFR BLD AUTO: 36.1 %
MCH RBC QN AUTO: 31 PG (ref 26–34)
MCHC RBC AUTO-ENTMCNC: 32.7 G/DL (ref 31–37)
MCV RBC AUTO: 94.9 FL
MONOCYTES # BLD AUTO: 0.49 X10(3) UL (ref 0.1–1)
MONOCYTES NFR BLD AUTO: 7.9 %
NEUTROPHILS # BLD AUTO: 3.28 X10 (3) UL (ref 1.5–7.7)
NEUTROPHILS # BLD AUTO: 3.28 X10(3) UL (ref 1.5–7.7)
NEUTROPHILS NFR BLD AUTO: 52.8 %
NONHDLC SERPL-MCNC: 115 MG/DL (ref ?–130)
OSMOLALITY SERPL CALC.SUM OF ELEC: 295 MOSM/KG (ref 275–295)
PLATELET # BLD AUTO: 288 10(3)UL (ref 150–450)
POTASSIUM SERPL-SCNC: 3.9 MMOL/L (ref 3.5–5.1)
PROT SERPL-MCNC: 7.3 G/DL (ref 6.4–8.2)
PSA SERPL-MCNC: 1.85 NG/ML (ref ?–4)
RBC # BLD AUTO: 4.9 X10(6)UL
SODIUM SERPL-SCNC: 142 MMOL/L (ref 136–145)
TRIGL SERPL-MCNC: 68 MG/DL (ref 30–149)
VLDLC SERPL CALC-MCNC: 11 MG/DL (ref 0–30)
WBC # BLD AUTO: 6.2 X10(3) UL (ref 4–11)

## 2022-10-06 PROCEDURE — 80061 LIPID PANEL: CPT

## 2022-10-06 PROCEDURE — 85025 COMPLETE CBC W/AUTO DIFF WBC: CPT

## 2022-10-06 PROCEDURE — 80053 COMPREHEN METABOLIC PANEL: CPT

## 2022-10-06 PROCEDURE — 84153 ASSAY OF PSA TOTAL: CPT

## 2023-02-18 RX ORDER — VERAPAMIL HYDROCHLORIDE 240 MG/1
CAPSULE, EXTENDED RELEASE ORAL
Qty: 90 CAPSULE | Refills: 1 | Status: CANCELLED | OUTPATIENT
Start: 2023-02-18

## 2023-02-20 RX ORDER — VERAPAMIL HYDROCHLORIDE 240 MG/1
CAPSULE, EXTENDED RELEASE ORAL
Qty: 90 CAPSULE | Refills: 0 | Status: SHIPPED | OUTPATIENT
Start: 2023-02-20

## 2023-05-16 ENCOUNTER — PATIENT MESSAGE (OUTPATIENT)
Dept: FAMILY MEDICINE CLINIC | Facility: CLINIC | Age: 58
End: 2023-05-16

## 2023-05-16 RX ORDER — VERAPAMIL HYDROCHLORIDE 240 MG/1
240 CAPSULE, EXTENDED RELEASE ORAL NIGHTLY
Qty: 30 CAPSULE | Refills: 1 | Status: SHIPPED | OUTPATIENT
Start: 2023-05-16

## 2023-05-16 RX ORDER — OMEPRAZOLE 20 MG/1
20 CAPSULE, DELAYED RELEASE ORAL DAILY PRN
Qty: 30 CAPSULE | Refills: 1 | Status: SHIPPED | OUTPATIENT
Start: 2023-05-16

## 2023-06-22 ENCOUNTER — OFFICE VISIT (OUTPATIENT)
Dept: FAMILY MEDICINE CLINIC | Facility: CLINIC | Age: 58
End: 2023-06-22
Payer: COMMERCIAL

## 2023-06-22 VITALS
RESPIRATION RATE: 16 BRPM | HEART RATE: 72 BPM | SYSTOLIC BLOOD PRESSURE: 104 MMHG | DIASTOLIC BLOOD PRESSURE: 62 MMHG | TEMPERATURE: 98 F | BODY MASS INDEX: 33.88 KG/M2 | WEIGHT: 242 LBS | HEIGHT: 71 IN

## 2023-06-22 DIAGNOSIS — E07.89 THYROID FULLNESS: Primary | ICD-10-CM

## 2023-06-22 DIAGNOSIS — Z12.5 SCREENING FOR PROSTATE CANCER: ICD-10-CM

## 2023-06-22 DIAGNOSIS — Z99.89 OSA ON CPAP: ICD-10-CM

## 2023-06-22 DIAGNOSIS — I49.9 CARDIAC ARRHYTHMIA, UNSPECIFIED CARDIAC ARRHYTHMIA TYPE: ICD-10-CM

## 2023-06-22 DIAGNOSIS — Z13.220 SCREENING FOR LIPOID DISORDERS: ICD-10-CM

## 2023-06-22 DIAGNOSIS — K21.9 GASTROESOPHAGEAL REFLUX DISEASE WITHOUT ESOPHAGITIS: ICD-10-CM

## 2023-06-22 DIAGNOSIS — G47.33 OSA ON CPAP: ICD-10-CM

## 2023-06-22 DIAGNOSIS — D12.6 ADENOMATOUS POLYP OF COLON, UNSPECIFIED PART OF COLON: ICD-10-CM

## 2023-06-22 PROCEDURE — 3008F BODY MASS INDEX DOCD: CPT | Performed by: FAMILY MEDICINE

## 2023-06-22 PROCEDURE — 99214 OFFICE O/P EST MOD 30 MIN: CPT | Performed by: FAMILY MEDICINE

## 2023-06-22 PROCEDURE — 3074F SYST BP LT 130 MM HG: CPT | Performed by: FAMILY MEDICINE

## 2023-06-22 PROCEDURE — 3078F DIAST BP <80 MM HG: CPT | Performed by: FAMILY MEDICINE

## 2023-07-05 ENCOUNTER — PATIENT MESSAGE (OUTPATIENT)
Dept: FAMILY MEDICINE CLINIC | Facility: CLINIC | Age: 58
End: 2023-07-05

## 2023-07-05 NOTE — TELEPHONE ENCOUNTER
I do not know of anyone personally in [facility] Does [facility] have anybody in the [address] [facility]  [address] area?

## 2023-07-05 NOTE — TELEPHONE ENCOUNTER
From: Chetan Gan  To: Lakhwinder Ayers MD  Sent: 7/5/2023 10:05 AM CDT  Subject: Suggested Dr Dr Mali Leon,  I live in [place] and with you retiring , can you recommend a Dr closer to [place] ?

## 2023-07-10 RX ORDER — VERAPAMIL HYDROCHLORIDE 240 MG/1
CAPSULE, EXTENDED RELEASE ORAL
Qty: 30 CAPSULE | Refills: 0 | Status: SHIPPED | OUTPATIENT
Start: 2023-07-10

## 2023-07-10 RX ORDER — OMEPRAZOLE 20 MG/1
20 CAPSULE, DELAYED RELEASE ORAL
Qty: 30 CAPSULE | Refills: 0 | Status: SHIPPED | OUTPATIENT
Start: 2023-07-10

## 2023-07-10 NOTE — TELEPHONE ENCOUNTER
LOV: 06/22/2023  Next OV: No follow-ups on file. Last Refill:  Medication Quantity Refills Start End   Verapamil HCl  MG Oral Capsule SR 24 Hr 30 capsule 1 5/16/2023    Sig:   Take 240 mg by mouth nightly. TAKE AT BEDTIME     Route:   Oral       Medication Quantity Refills Start End   omeprazole 20 MG Oral Capsule Delayed Release 30 capsule 1 5/16/2023    Sig:   Take 1 capsule (20 mg total) by mouth daily as needed.      Route:   Oral

## 2023-07-12 ENCOUNTER — HOSPITAL ENCOUNTER (OUTPATIENT)
Dept: ULTRASOUND IMAGING | Age: 58
Discharge: HOME | End: 2023-07-12
Attending: FAMILY MEDICINE
Payer: COMMERCIAL

## 2023-07-12 DIAGNOSIS — E07.89 THYROID FULLNESS: ICD-10-CM

## 2023-07-12 PROCEDURE — 76536 US EXAM OF HEAD AND NECK: CPT | Performed by: FAMILY MEDICINE

## 2023-07-13 DIAGNOSIS — E04.9 ENLARGED THYROID GLAND: Primary | ICD-10-CM

## 2023-07-13 DIAGNOSIS — R93.89 THYROID WITH HETEROGENEOUS ECHOTEXTURE DETERMINED BY ULTRASOUND: ICD-10-CM

## 2023-08-07 RX ORDER — OMEPRAZOLE 20 MG/1
20 CAPSULE, DELAYED RELEASE ORAL
Qty: 90 CAPSULE | Refills: 0 | Status: SHIPPED | OUTPATIENT
Start: 2023-08-07

## 2023-08-24 RX ORDER — VERAPAMIL HYDROCHLORIDE 240 MG/1
CAPSULE, EXTENDED RELEASE ORAL
Qty: 30 CAPSULE | Refills: 2 | Status: SHIPPED | OUTPATIENT
Start: 2023-08-24

## 2023-11-20 DIAGNOSIS — I49.9 CARDIAC ARRHYTHMIA, UNSPECIFIED CARDIAC ARRHYTHMIA TYPE: Primary | ICD-10-CM

## 2023-11-20 NOTE — TELEPHONE ENCOUNTER
Last office visit: 6/22/2023  Next office visit: none  Last refill: 8/24/2023    Verapamil HCl  MG Oral Capsule SR 24 Hr 30 capsule 2 8/24/2023    Sig:   TAKE 1 CAPSULE BY MOUTH AT BEDTIME       Protocol:   Hypertension Medications Protocol [id number]/2023 08:54 AM   Protocol Details CMP or BMP in past 12 months    Last serum creatinine< 2.0    Appointment in past 6 or next 3 months     Please review and refill if appropriate, thank you!

## 2023-11-21 RX ORDER — VERAPAMIL HYDROCHLORIDE 240 MG/1
CAPSULE, EXTENDED RELEASE ORAL
Qty: 90 CAPSULE | Refills: 0 | Status: SHIPPED | OUTPATIENT
Start: 2023-11-21

## 2024-02-26 DIAGNOSIS — I49.9 CARDIAC ARRHYTHMIA, UNSPECIFIED CARDIAC ARRHYTHMIA TYPE: ICD-10-CM

## 2024-02-26 RX ORDER — VERAPAMIL HYDROCHLORIDE 240 MG/1
CAPSULE, EXTENDED RELEASE ORAL
Qty: 90 CAPSULE | Refills: 0 | OUTPATIENT
Start: 2024-02-26

## 2025-02-25 ENCOUNTER — HOSPITAL ENCOUNTER (EMERGENCY)
Age: 60
Discharge: HOME | End: 2025-02-25
Attending: STUDENT IN AN ORGANIZED HEALTH CARE EDUCATION/TRAINING PROGRAM
Payer: COMMERCIAL

## 2025-02-25 VITALS
BODY MASS INDEX: 33.6 KG/M2 | DIASTOLIC BLOOD PRESSURE: 90 MMHG | HEART RATE: 80 BPM | HEIGHT: 71 IN | TEMPERATURE: 99 F | OXYGEN SATURATION: 96 % | RESPIRATION RATE: 18 BRPM | WEIGHT: 240 LBS | SYSTOLIC BLOOD PRESSURE: 141 MMHG

## 2025-02-25 DIAGNOSIS — G51.0 BELL'S PALSY: Primary | ICD-10-CM

## 2025-02-25 PROCEDURE — 99284 EMERGENCY DEPT VISIT MOD MDM: CPT

## 2025-02-25 PROCEDURE — 99283 EMERGENCY DEPT VISIT LOW MDM: CPT

## 2025-02-25 RX ORDER — PREDNISONE 10 MG/1
30 TABLET ORAL 2 TIMES DAILY
Qty: 42 TABLET | Refills: 0 | Status: SHIPPED | OUTPATIENT
Start: 2025-02-25 | End: 2025-03-04

## 2025-02-25 RX ORDER — VALACYCLOVIR HYDROCHLORIDE 1 G/1
1000 TABLET, FILM COATED ORAL 3 TIMES DAILY
Qty: 21 TABLET | Refills: 0 | Status: SHIPPED | OUTPATIENT
Start: 2025-02-25 | End: 2025-03-04

## 2025-02-25 NOTE — ED PROVIDER NOTES
Patient Seen in: [facility] In [place]      History     Chief Complaint   Patient presents with    Numbness Weakness     Stated Complaint: right sided facial numbness that started yesterday, swent by PCP after vist on *    Subjective:   HPI      59-year-old gentleman.  Patient arrives to the ER for evaluation of right facial paralysis.  Onset of symptoms yesterday.  First noted that his eye did not seem to be blinking correctly.  Has now progressed to mouth droop, inability to close the right eye and generalized paralysis of the right cheek.  Continues to have some movement of the right eyebrow.  Has recently had a cough but no other acute illness.  Denies any headache.  No other focal deficits, numbness is or weaknesses.  No neck pain.  No dizziness.  Feels well.  No rash.    Objective:     Past Medical History:    Abdominal pain    Abdominal pain, generalized    Abdominal pain, right upper quadrant    Arrhythmia    Back pain    Bloating    Blood in the stool    Calculus of kidney    Cardiac dysrhythmia, unspecified    Cellulitis and abscess of toe, unspecified    Constipation    Diarrhea    Diarrhea, unspecified    Esophageal reflux    Flatulence/gas pain/belching    Heart palpitations    Hemorrhoids    Ingrowing nail    Irregular bowel habits    Kidney stones    10/2016 seen at [facility]     Migraines    Osteoarthritis    bilateral hips    Pain in joint, shoulder region    Sleep apnea    CPAP    Unspecified gastritis and gastroduodenitis without mention of hemorrhage    Unspecified visual disturbance    Visual impairment    glasses    Wears glasses              Past Surgical History:   Procedure Laterality Date    Cataract      right    Cholecystectomy  2001    Colonoscopy      Colonoscopy N/A 7/2/2015    Procedure: COLONOSCOPY;  Surgeon: Bharti Santiago DO;  Location:  ENDOSCOPY    Other Right     retina repair    Other surgical history N/A 8/20/2015    Procedure:  ESOPHAGOGASTRODUODENOSCOPY (EGD);  Surgeon: Bharti Santiago DO;  Location:  ENDOSCOPY    Upper gi endoscopy,exam                  Social History     Socioeconomic History    Marital status: Single   Tobacco Use    Smoking status: Never    Smokeless tobacco: Never   Vaping Use    Vaping status: Never Used   Substance and Sexual Activity    Alcohol use: Yes     Alcohol/week: 1.0 standard drink of alcohol     Types: 1 Glasses of wine per week     Comment: rare    Drug use: No   Other Topics Concern    Caffeine Concern Yes     Comment: 1 coffee daily     Exercise Yes     Comment: regular                  Physical Exam     ED Triage Vitals [02/25/25 1336]   /90   Pulse 80   Resp 18   Temp 98.5 °F (36.9 °C)   Temp src Oral   SpO2 96 %   O2 Device None (Room air)       Current Vitals:   Vital Signs  BP: 141/90  Pulse: 80  Resp: 18  Temp: 98.5 °F (36.9 °C)  Temp src: Oral    Oxygen Therapy  SpO2: 96 %  O2 Device: None (Room air)        Physical Exam     Gen: Well appearing, well groomed, alert and aware x 3  Neck: Supple, full range of motion, no thyromegaly or lymphadenopathy.  Eye examination: EOMs are intact, normal conjunctival, PERRLA  ENT: No injection noted to the bilateral auditory canals; no loss of landmarks. Normal nasal mucosa without audible nasal congestion.  Oropharynx is patent without evidence of erythema, exudates or deviation.  No stridor to auscultation  Lung: No distress, RR, no retraction, breath sounds are clear bilaterally  Cardio: Regular rate and rhythm, normal S1-S2, no murmur appreciable  Skin: No sign of trauma, Skin warm and dry, no induration or sign of infection.  No rash noted  Neuro: Complete paralysis of the right malar and mouth.  Inability to blink.  Maintains some slight ability to raise the right eyebrow.    ED Course   Labs Reviewed - No data to display          MDM          My supervising physician was involved in the management of this patient.    Well-appearing male.   Exam highly suggestive of Bell's palsy.  Complete paralysis of the right malar and commissure.  Inability to blink.  Maintains some slight ability to raise the right eyebrow.    No other focal deficits.  No other complaints.  No headache or dizziness.    patient placed on 60 mg of prednisone daily for 7 days    1 g of valacyclovir 3 times daily for 1 week    Medical Decision Making  Attending Attestation  I saw and evaluated the patient personally. I was present during the key/critical portions of the service performed by the advanced practitioner, discussed the case with the advanced practitioner and provided a substantive portion of the medical decision making. I reviewed the note, findings and plan, and agree or disagree as noted.  Dr. Daly Isaac    Disposition and Plan     Clinical Impression:  1. Bell's palsy         Disposition:  There is no disposition on file for this visit.  There is no disposition time on file for this visit.    Follow-up:  No follow-up provider specified.        Medications Prescribed:  Current Discharge Medication List        START taking these medications    Details   predniSONE 10 MG Oral Tab Take 3 tablets (30 mg total) by mouth 2 (two) times daily for 7 days.  Qty: 42 tablet, Refills: 0      valACYclovir 1 G Oral Tab Take 1 tablet (1,000 mg total) by mouth 3 (three) times daily for 7 days.  Qty: 21 tablet, Refills: 0                 Supplementary Documentation:

## (undated) DEVICE — 450 ML BOTTLE OF 0.05% CHLORHEXIDINE GLUCONATE IN 99.95% STERILE WATER FOR IRRIGATION, USP AND APPLICATOR.: Brand: IRRISEPT ANTIMICROBIAL WOUND LAVAGE

## (undated) DEVICE — STERILE POLYISOPRENE POWDER-FREE SURGICAL GLOVES: Brand: PROTEXIS

## (undated) DEVICE — Device: Brand: STABLECUT®

## (undated) DEVICE — SUTURE VICRYL 2-0 CP-1

## (undated) DEVICE — #10 STERILE BLADE: Brand: POLYMER COATED BLADES

## (undated) DEVICE — DRAPE,U/SHT,SPLIT,FILM,60X84,STERILE: Brand: MEDLINE

## (undated) DEVICE — STERILE HOOK LOCK LATEX FREE ELASTIC BANDAGE 6INX5YD: Brand: HOOK LOCK™

## (undated) DEVICE — SUTURE VICRYL 1 OS-6

## (undated) DEVICE — CHLORAPREP 26ML APPLICATOR

## (undated) DEVICE — NEEDLE SPINAL 18X3-1/2 PINK.

## (undated) DEVICE — 2T11 #2 PDO 36 X 36: Brand: 2T11 #2 PDO 36 X 36

## (undated) DEVICE — STERILE POLYISOPRENE POWDER-FREE SURGICAL GLOVES WITH EMOLLIENT COATING: Brand: PROTEXIS

## (undated) DEVICE — LIGHT HANDLE

## (undated) DEVICE — TOTAL HIP CDS: Brand: MEDLINE INDUSTRIES, INC.

## (undated) DEVICE — HOOD: Brand: FLYTE

## (undated) DEVICE — HOOD, PEEL-AWAY: Brand: FLYTE

## (undated) DEVICE — CERAMIC HEAD UPCHARGE: Type: IMPLANTABLE DEVICE

## (undated) DEVICE — GOWN SURG AERO CHROME XXL

## (undated) DEVICE — SYRINGE 30ML LL TIP

## (undated) DEVICE — SOL  .9 3000ML

## (undated) DEVICE — TOTAL HIP W/POR CUP & COCR HD: Type: IMPLANTABLE DEVICE

## (undated) DEVICE — BLADE ELECTRODE: Brand: EDGE

## (undated) DEVICE — SCD SLEEVE KNEE HI BLEND

## (undated) DEVICE — BIPOLAR SEALER 23-112-1 AQM 6.0: Brand: AQUAMANTYS™

## (undated) DEVICE — PILLOW FX 56X38X15CM MED HIP

## (undated) NOTE — LETTER
09/28/21      Pankaj Connolly was seen in my clinic on 9/23/21 for a pre-operative physical exam. Patient is to have left total hip arthroplasty to be done by Dr. Searcy Spurling at [facility] on 10/06/21.  Patient's required pre-op labs/imaging inc

## (undated) NOTE — MR AVS SNAPSHOT
[facility] [address], [address] [id number] [phone]               Thank you for choosing us for your health care visit with Angelique Hong PA-C.   We are glad to serve you and happy to provide you with this Ortho Referral - In Network    Complete by:  As directed    Assoc Dx:  Ganglion cyst of wrist, left [N75.780]                 Referral Details     Referred By    Referred To    NAIF Chávez [address] [facility] [address] [facility] [id number]   Nahed Your unique [employer] Access Code: [email or web address]  Expires: 3/27/2017 12:17 PM    If you have questions, you can call [phone] to talk to our [facility] Staff. Remember, [employer] is NOT to be used for urgent needs. For medical emergencies, dial 911.

## (undated) NOTE — LETTER
10/22/21        Latonya Myles  [address] [place] [phone]      Dear Shana Spotted records indicate that you have outstanding lab work and or testing that was ordered for you and has not yet been completed:  Orders Placed This Encoun

## (undated) NOTE — LETTER
8/17/2017    [address]    Dear Mr. Mai Haskins office has been trying to contact you to discuss your recent test results or you are due for an appointment with your provider.   It is important that we reach you to